# Patient Record
Sex: MALE | Race: WHITE | NOT HISPANIC OR LATINO | URBAN - METROPOLITAN AREA
[De-identification: names, ages, dates, MRNs, and addresses within clinical notes are randomized per-mention and may not be internally consistent; named-entity substitution may affect disease eponyms.]

---

## 2017-01-02 ENCOUNTER — EMERGENCY (EMERGENCY)
Facility: HOSPITAL | Age: 32
LOS: 0 days | Discharge: HOME | End: 2017-01-02

## 2017-06-27 DIAGNOSIS — G40.909 EPILEPSY, UNSPECIFIED, NOT INTRACTABLE, WITHOUT STATUS EPILEPTICUS: ICD-10-CM
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
## 2017-06-27 DIAGNOSIS — S42.211A UNSPECIFIED DISPLACED FRACTURE OF SURGICAL NECK OF RIGHT HUMERUS, INITIAL ENCOUNTER FOR CLOSED FRACTURE: ICD-10-CM

## 2019-03-27 ENCOUNTER — OUTPATIENT (OUTPATIENT)
Dept: OUTPATIENT SERVICES | Facility: HOSPITAL | Age: 34
LOS: 1 days | Discharge: HOME | End: 2019-03-27

## 2019-03-27 DIAGNOSIS — R51 HEADACHE: ICD-10-CM

## 2019-03-27 DIAGNOSIS — R42 DIZZINESS AND GIDDINESS: ICD-10-CM

## 2019-03-28 ENCOUNTER — OUTPATIENT (OUTPATIENT)
Dept: OUTPATIENT SERVICES | Facility: HOSPITAL | Age: 34
LOS: 1 days | Discharge: HOME | End: 2019-03-28

## 2019-03-28 DIAGNOSIS — G35 MULTIPLE SCLEROSIS: ICD-10-CM

## 2019-04-01 ENCOUNTER — INPATIENT (INPATIENT)
Facility: HOSPITAL | Age: 34
LOS: 3 days | Discharge: HOME | End: 2019-04-05
Attending: HOSPITALIST | Admitting: HOSPITALIST
Payer: COMMERCIAL

## 2019-04-01 ENCOUNTER — RESULT REVIEW (OUTPATIENT)
Age: 34
End: 2019-04-01

## 2019-04-01 VITALS
HEART RATE: 78 BPM | DIASTOLIC BLOOD PRESSURE: 78 MMHG | SYSTOLIC BLOOD PRESSURE: 132 MMHG | RESPIRATION RATE: 18 BRPM | TEMPERATURE: 98 F

## 2019-04-01 DIAGNOSIS — Z98.890 OTHER SPECIFIED POSTPROCEDURAL STATES: Chronic | ICD-10-CM

## 2019-04-01 LAB
ALBUMIN SERPL ELPH-MCNC: 4.9 G/DL — SIGNIFICANT CHANGE UP (ref 3.5–5.2)
ALP SERPL-CCNC: 99 U/L — SIGNIFICANT CHANGE UP (ref 30–115)
ALT FLD-CCNC: 92 U/L — HIGH (ref 0–41)
ANION GAP SERPL CALC-SCNC: 12 MMOL/L — SIGNIFICANT CHANGE UP (ref 7–14)
APPEARANCE CSF: CLEAR — SIGNIFICANT CHANGE UP
APPEARANCE SPUN FLD: COLORLESS — SIGNIFICANT CHANGE UP
APTT BLD: 33.3 SEC — SIGNIFICANT CHANGE UP (ref 27–39.2)
AST SERPL-CCNC: 46 U/L — HIGH (ref 0–41)
BASOPHILS # BLD AUTO: 0.05 K/UL — SIGNIFICANT CHANGE UP (ref 0–0.2)
BASOPHILS NFR BLD AUTO: 0.9 % — SIGNIFICANT CHANGE UP (ref 0–1)
BILIRUB SERPL-MCNC: 0.6 MG/DL — SIGNIFICANT CHANGE UP (ref 0.2–1.2)
BUN SERPL-MCNC: 12 MG/DL — SIGNIFICANT CHANGE UP (ref 10–20)
CALCIUM SERPL-MCNC: 10.1 MG/DL — SIGNIFICANT CHANGE UP (ref 8.5–10.1)
CHLORIDE SERPL-SCNC: 106 MMOL/L — SIGNIFICANT CHANGE UP (ref 98–110)
CO2 SERPL-SCNC: 24 MMOL/L — SIGNIFICANT CHANGE UP (ref 17–32)
COLOR CSF: COLORLESS — SIGNIFICANT CHANGE UP
CREAT SERPL-MCNC: 1 MG/DL — SIGNIFICANT CHANGE UP (ref 0.7–1.5)
EOSINOPHIL # BLD AUTO: 0.18 K/UL — SIGNIFICANT CHANGE UP (ref 0–0.7)
EOSINOPHIL NFR BLD AUTO: 3.2 % — SIGNIFICANT CHANGE UP (ref 0–8)
GLUCOSE CSF-MCNC: 64 MG/DL — SIGNIFICANT CHANGE UP (ref 45–75)
GLUCOSE SERPL-MCNC: 93 MG/DL — SIGNIFICANT CHANGE UP (ref 70–99)
HCT VFR BLD CALC: 48.1 % — SIGNIFICANT CHANGE UP (ref 42–52)
HGB BLD-MCNC: 16.1 G/DL — SIGNIFICANT CHANGE UP (ref 14–18)
IMM GRANULOCYTES NFR BLD AUTO: 0.5 % — HIGH (ref 0.1–0.3)
INR BLD: 1.01 RATIO — SIGNIFICANT CHANGE UP (ref 0.65–1.3)
LYMPHOCYTES # BLD AUTO: 1.79 K/UL — SIGNIFICANT CHANGE UP (ref 1.2–3.4)
LYMPHOCYTES # BLD AUTO: 32 % — SIGNIFICANT CHANGE UP (ref 20.5–51.1)
MCHC RBC-ENTMCNC: 29.3 PG — SIGNIFICANT CHANGE UP (ref 27–31)
MCHC RBC-ENTMCNC: 33.5 G/DL — SIGNIFICANT CHANGE UP (ref 32–37)
MCV RBC AUTO: 87.6 FL — SIGNIFICANT CHANGE UP (ref 80–94)
MONOCYTES # BLD AUTO: 0.62 K/UL — HIGH (ref 0.1–0.6)
MONOCYTES NFR BLD AUTO: 11.1 % — HIGH (ref 1.7–9.3)
NEUTROPHILS # BLD AUTO: 2.93 K/UL — SIGNIFICANT CHANGE UP (ref 1.4–6.5)
NEUTROPHILS # CSF: SIGNIFICANT CHANGE UP % (ref 0–6)
NEUTROPHILS NFR BLD AUTO: 52.3 % — SIGNIFICANT CHANGE UP (ref 42.2–75.2)
NRBC # BLD: 0 /100 WBCS — SIGNIFICANT CHANGE UP (ref 0–0)
NRBC NFR CSF: 1 /UL — SIGNIFICANT CHANGE UP (ref 0–5)
PLATELET # BLD AUTO: 215 K/UL — SIGNIFICANT CHANGE UP (ref 130–400)
POTASSIUM SERPL-MCNC: 4.5 MMOL/L — SIGNIFICANT CHANGE UP (ref 3.5–5)
POTASSIUM SERPL-SCNC: 4.5 MMOL/L — SIGNIFICANT CHANGE UP (ref 3.5–5)
PROT CSF-MCNC: 33 MG/DL — SIGNIFICANT CHANGE UP (ref 15–45)
PROT SERPL-MCNC: 7.6 G/DL — SIGNIFICANT CHANGE UP (ref 6–8)
PROTHROM AB SERPL-ACNC: 11.6 SEC — SIGNIFICANT CHANGE UP (ref 9.95–12.87)
RBC # BLD: 5.49 M/UL — SIGNIFICANT CHANGE UP (ref 4.7–6.1)
RBC # CSF: 3 /UL — SIGNIFICANT CHANGE UP (ref 0–0)
RBC # FLD: 11.9 % — SIGNIFICANT CHANGE UP (ref 11.5–14.5)
SODIUM SERPL-SCNC: 142 MMOL/L — SIGNIFICANT CHANGE UP (ref 135–146)
TUBE TYPE: SIGNIFICANT CHANGE UP
WBC # BLD: 5.6 K/UL — SIGNIFICANT CHANGE UP (ref 4.8–10.8)
WBC # FLD AUTO: 5.6 K/UL — SIGNIFICANT CHANGE UP (ref 4.8–10.8)

## 2019-04-01 PROCEDURE — 62270 DX LMBR SPI PNXR: CPT

## 2019-04-01 PROCEDURE — 77003 FLUOROGUIDE FOR SPINE INJECT: CPT | Mod: 26

## 2019-04-01 PROCEDURE — 88108 CYTOPATH CONCENTRATE TECH: CPT | Mod: 26

## 2019-04-01 RX ORDER — LEVETIRACETAM 250 MG/1
500 TABLET, FILM COATED ORAL
Qty: 0 | Refills: 0 | Status: DISCONTINUED | OUTPATIENT
Start: 2019-04-01 | End: 2019-04-03

## 2019-04-01 RX ORDER — LEVETIRACETAM 250 MG/1
1 TABLET, FILM COATED ORAL
Qty: 0 | Refills: 0 | COMMUNITY

## 2019-04-01 RX ORDER — CHLORHEXIDINE GLUCONATE 213 G/1000ML
1 SOLUTION TOPICAL
Qty: 0 | Refills: 0 | Status: DISCONTINUED | OUTPATIENT
Start: 2019-04-01 | End: 2019-04-05

## 2019-04-01 RX ADMIN — LEVETIRACETAM 500 MILLIGRAM(S): 250 TABLET, FILM COATED ORAL at 18:32

## 2019-04-01 RX ADMIN — Medication 58 MILLIGRAM(S): at 17:04

## 2019-04-01 NOTE — H&P ADULT - ASSESSMENT
34 y.o male patient with PMH of epilepsy is being admitted for lumbar puncture and pulse steroids for suspected MS.    # Suspected MS  - Active lesion on MRI (see results above)  - F/U lumbar puncture results  - Will give Solumedrol 1g q24h for 5 days as per neurology; check FS and start insulin if needed  - Check NMO and MOG antibodies  - F/U with neurology    # History of epilepsy  - Generalized  - Last seizure 3 years ago  - Continue Keppra 500mg q12h (Patient would like to use his own medication)  - Monitor electrolytes and keep Mg >2  - F/U with neurology    # Elevated ALT  - Check repeat liver function test    # DVT prophylaxis: encourage ambulation  # Regular diet, switch to carb consistent diet if FS elevated 34 y.o male patient with PMH of epilepsy is being admitted for lumbar puncture and pulse steroids for suspected MS.    # Suspected MS  - Active lesion on MRI (see results above)  - F/U lumbar puncture results  - Will give Solumedrol 1g q24h for 5 days as per neurology; check FS and start insulin if needed  - Check NMO and MOG antibodies  - F/U with neurology    # History of epilepsy  - Generalized  - Last seizure 3 years ago  - Continue Keppra 500mg q12h (Patient would like to use his own medication)  - Monitor electrolytes and keep Mg >2  - F/U with neurology    # Elevated ALT  - Secondary to Keppra?  - No GI symptoms  - Monitor LFTs    # DVT prophylaxis: encourage ambulation  # Regular diet, switch to carb consistent diet if FS elevated 34 y.o male patient with PMH of epilepsy is being admitted for lumbar puncture and pulse steroids for suspected MS.    # Suspected MS  - Active lesion on MRI (see results above)  - F/U lumbar puncture results (done by IR)  - Will give Solumedrol 1g q24h for 5 days as per neurology; check FS and start insulin if needed  - Check NMO and MOG antibodies  - F/U with neurology    # History of epilepsy  - Generalized  - Last seizure 3 years ago  - Continue Keppra 500mg q12h (Patient would like to use his own medication)  - Monitor electrolytes and keep Mg >2  - F/U with neurology    # Elevated ALT  - Secondary to Keppra?  - No GI symptoms  - Monitor LFTs    # DVT prophylaxis: encourage ambulation  # Regular diet, switch to carb consistent diet if FS elevated

## 2019-04-01 NOTE — H&P ADULT - NEUROLOGICAL DETAILS
cranial nerves intact/alert and oriented x 3/normal strength/responds to verbal commands/no spontaneous movement

## 2019-04-01 NOTE — H&P ADULT - ATTENDING COMMENTS
Patient seen and examined independently. Agree with resident note with exceptions.     # CNS Demyelination, probably MS with optic neuritis  - S/p Spinal tap  - F/u NMO abs, MOG abs  - MRI Cervical and thoracic spine w/w/o gado  - Pt to receive IV solumedrol 1 gm for 5days    # H/o Epilepsy  - seizure precautions  - c/w keppra    # Transaminitis- resolving  - monitor LFT

## 2019-04-01 NOTE — H&P ADULT - RS GEN PE MLT RESP DETAILS PC
no wheezes/airway patent/no rales/good air movement/breath sounds equal/clear to auscultation bilaterally/respirations non-labored

## 2019-04-01 NOTE — H&P ADULT - NSHPLABSRESULTS_GEN_ALL_CORE
Labs:                          16.1   5.60  )-----------( 215      ( 01 Apr 2019 09:09 )             48.1     04-01    142  |  106  |  12  ----------------------------<  93  4.5   |  24  |  1.0    Ca    10.1      01 Apr 2019 09:09    TPro  7.6  /  Alb  4.9  /  TBili  0.6  /  DBili  x   /  AST  46<H>  /  ALT  92<H>  /  AlkPhos  99  04-01    PT/INR - ( 01 Apr 2019 09:09 )   PT: 11.60 sec;   INR: 1.01 ratio       PTT - ( 01 Apr 2019 09:09 )  PTT:33.3 sec    LIVER FUNCTIONS - ( 01 Apr 2019 09:09 )  Alb: 4.9 g/dL / Pro: 7.6 g/dL / ALK PHOS: 99 U/L / ALT: 92 U/L / AST: 46 U/L / GGT: x           < from: MR Head w/ IV Cont (03.28.19 @ 18:56) >    IMPRESSION:    1.  Redemonstrated numerous foci of white matter signal abnormality with a pattern/distribution suggesting demyelinating disease.    2.  Small focus of enhancement is noted along the right aspect of the anterior body of the corpus callosum suggesting active demyelination, series 11 image 63 and series 21 image 99.      < end of copied text >    < from: MR Head No Cont (03.27.19 @ 16:56) >    IMPRESSION:     Numerous foci of white matter signal abnormality. The pattern and   distribution suggests demyelinating disease.  Consider a postcontrast   study to assess for active demyelination.    < end of copied text >    < from: MR Angio Head No Cont (03.27.19 @ 16:41) >    IMPRESSION:      Unremarkable MRA of the brain.    < end of copied text >

## 2019-04-01 NOTE — CONSULT NOTE ADULT - ASSESSMENT
33 yo with PGE on AEDs currently with blurred vision and lightheaded/dizziness foudn with demyelinating plaques s/o CNS demyelination.      Plan:  - start solumedrol 1g IVPB x 5 days with first dose today  - f/u CSF results  - check NMO abs, MOG abs  - check MRI Cervical and thoracic spine w/w/o gado  - continue keppra 500 mg BID for now  - may d/c with outpt f/u s/p solumedrol complete

## 2019-04-01 NOTE — H&P ADULT - NSHPSOCIALHISTORY_GEN_ALL_CORE
Non smoker; drinks 1-2 glasses of wine daily but hasn't drank for the past month; no illicit drug use

## 2019-04-01 NOTE — CONSULT NOTE ADULT - SUBJECTIVE AND OBJECTIVE BOX
Neurology Consult    Patient is a 34y old  Male who presents with a chief complaint of Suspicion for MS / pulse steroids (01 Apr 2019 12:07)      HPI:  34 y.o male patient with PMH of PGE on AEDs stable p/w 1 week h/o bilateral HA with dizziness and lightheaded a/w binocular blurred vision.  Symptoms improved slightly over last week, seen by neurologist Dr. Lewis sent for MRI with areas of acute and chronic demyelinating plaques.  Had LP today for further assessment and sent to hospital for IV solumedrol.  Pt currently stable. Still feels lightheaded with blurred vision slight but persistent.  No weakness or numbness.  No vertigo or ataxia.  Had numbness in the L thigh after LP otherwise normal.  No eye pain with movement.      PAST MEDICAL & SURGICAL HISTORY:  Epilepsy  H/O hernia repair    FAMILY HISTORY:  FH: epilepsy: Father with epilepsy    Social History: (-) x 3    Allergies    No Known Allergies    Intolerances    MEDICATIONS  (STANDING):  chlorhexidine 4% Liquid 1 Application(s) Topical <User Schedule>  levETIRAcetam 500 milliGRAM(s) Oral two times a day  methylPREDNISolone sodium succinate IVPB 1000 milliGRAM(s) IV Intermittent daily    MEDICATIONS  (PRN):    Review of systems:    Constitutional: as per HPI  Eyes: No eye pain or discharge  ENMT:  No difficulty hearing; No sinus or throat pain  Neck: No pain or stiffness  Respiratory: No cough, wheezing, chills or hemoptysis  Cardiovascular: No chest pain, palpitations, shortness of breath, dyspnea on exertion  Gastrointestinal: No abdominal pain, nausea, vomiting or hematemesis; No diarrhea or constipation.   Genitourinary: No dysuria, frequency, hematuria or incontinence  Neurological: As per HPI  Skin: No rashes or lesions   Endocrine: No heat or cold intolerance; No hair loss  Musculoskeletal: No joint pain or swelling  Psychiatric: No depression, anxiety, mood swings  Heme/Lymph: No easy bruising or bleeding gums    Vital Signs Last 24 Hrs  T(C): 36.4 (01 Apr 2019 14:49), Max: 36.4 (01 Apr 2019 14:49)  T(F): 97.5 (01 Apr 2019 14:49), Max: 97.5 (01 Apr 2019 14:49)  HR: 78 (01 Apr 2019 14:49) (78 - 78)  BP: 132/78 (01 Apr 2019 14:49) (132/78 - 132/78)  BP(mean): --  RR: 18 (01 Apr 2019 14:49) (18 - 18)  SpO2: --    Examination:  General:  Appearance is consistent with chronologic age.  No abnormal facies.  Gross skin survey within normal limits.    Cognitive/Language:  The patient is oriented to person, place, time and date.  Recent and remote memory intact.  Fund of knowledge is intact and normal.  Language with normal repetition, comprehension and naming.  Nondysarthric.    Eyes: intact VA, VFF.  EOMI w/o nystagmus, skew or reported double vision.  PERRL.  No ptosis/weakness of eyelid closure.    Face:  Facial sensation normal V1 - 3, no facial asymmetry.    Ears/Nose/Throat:  Hearing grossly intact b/l.  Palate elevates midline.  Tongue and uvula midline.   Motor examination:   Normal tone, bulk and range of motion.  No tenderness, twitching, tremors or involuntary movements.  Formal Muscle Strength Testing: (MRC grade R/L) 5/5 UE; 5/5 LE.  No observable drift.  Reflexes:   2+ b/l pectoralis, biceps, triceps, brachioradialis, patella and Achilles.  Plantar response downgoing b/l.  Jaw jerk, Richelle, clonus absent.  Sensory examination:   Intact to light touch and pinprick, pain, temperature and proprioception and vibration in all extremities.  Cerebellum:   FTN/HKS intact with normal BOSTON in all limbs.  No dysmetria or dysdiadokinesia.  Gait narrow based and normal.    Labs:   CBC Full  -  ( 01 Apr 2019 09:09 )  WBC Count : 5.60 K/uL  RBC Count : 5.49 M/uL  Hemoglobin : 16.1 g/dL  Hematocrit : 48.1 %  Platelet Count - Automated : 215 K/uL  Mean Cell Volume : 87.6 fL  Mean Cell Hemoglobin : 29.3 pg  Mean Cell Hemoglobin Concentration : 33.5 g/dL  Auto Neutrophil # : 2.93 K/uL  Auto Lymphocyte # : 1.79 K/uL  Auto Monocyte # : 0.62 K/uL  Auto Eosinophil # : 0.18 K/uL  Auto Basophil # : 0.05 K/uL  Auto Neutrophil % : 52.3 %  Auto Lymphocyte % : 32.0 %  Auto Monocyte % : 11.1 %  Auto Eosinophil % : 3.2 %  Auto Basophil % : 0.9 %    04-01    142  |  106  |  12  ----------------------------<  93  4.5   |  24  |  1.0    Ca    10.1      01 Apr 2019 09:09    TPro  7.6  /  Alb  4.9  /  TBili  0.6  /  DBili  x   /  AST  46<H>  /  ALT  92<H>  /  AlkPhos  99  04-01    LIVER FUNCTIONS - ( 01 Apr 2019 09:09 )  Alb: 4.9 g/dL / Pro: 7.6 g/dL / ALK PHOS: 99 U/L / ALT: 92 U/L / AST: 46 U/L / GGT: x           PT/INR - ( 01 Apr 2019 09:09 )   PT: 11.60 sec;   INR: 1.01 ratio         PTT - ( 01 Apr 2019 09:09 )  PTT:33.3 sec    Glucose, CSF: 64 mg/dL (04-01-19 @ 14:43)  Protein, CSF: 33 mg/dL (04-01-19 @ 14:43)    < from: MR Head w/ IV Cont (03.28.19 @ 18:56) >  IMPRESSION:    1.  Redemonstrated numerous foci of white matter signal abnormality with   a pattern/distribution suggesting demyelinating disease.    2.  Small focus of enhancement is noted along the right aspect of the   anterior body of the corpus callosum suggesting active demyelination,   series 11 image 63 and series 21 image 99.      AMOL JONES M.D., ATTENDING RADIOLOGIST  This document has been electronically signed. Mar 29 2019  9:43AM    < end of copied text >        04-01-19 @ 15:40

## 2019-04-01 NOTE — H&P ADULT - HISTORY OF PRESENT ILLNESS
34 y.o male patient with PMH of epilepsy is being admitted for lumbar puncture and pulse steroids for suspected MS.    History goes back to about 1 week prior to presentation when the patient developed a headache, bi-parietal, with no exacerbating or relieving factors, slightly improved with Tylenol and Excedrin. This type of headache was new to the patient. Headache was associated with lightheadedness and vertigo as well as constant bilateral blurry vision.  He visited his neurologist who ordered an MRI that showed lesions suspicious for demyelinating disease.  He was admitted to the hospital for lumbar puncture and pulse steroids.    Of note, 2 weeks ago, patient had rhinorrhea and a productive and was prescribed antibiotics. ROS otherwise negative

## 2019-04-02 LAB
% ALBUMIN: 63.3 % — SIGNIFICANT CHANGE UP
% ALPHA 1: 3.1 % — SIGNIFICANT CHANGE UP
% ALPHA 2: 7.8 % — SIGNIFICANT CHANGE UP
% BETA: 12.2 % — SIGNIFICANT CHANGE UP
% GAMMA: 13.6 % — SIGNIFICANT CHANGE UP
ALBUMIN SERPL ELPH-MCNC: 4.9 G/DL — SIGNIFICANT CHANGE UP (ref 3.6–5.5)
ALBUMIN SERPL ELPH-MCNC: 5.1 G/DL — SIGNIFICANT CHANGE UP (ref 3.5–5.2)
ALBUMIN/GLOB SERPL ELPH: 1.8 RATIO — SIGNIFICANT CHANGE UP
ALP SERPL-CCNC: 98 U/L — SIGNIFICANT CHANGE UP (ref 30–115)
ALPHA1 GLOB SERPL ELPH-MCNC: 0.2 G/DL — SIGNIFICANT CHANGE UP (ref 0.1–0.4)
ALPHA2 GLOB SERPL ELPH-MCNC: 0.6 G/DL — SIGNIFICANT CHANGE UP (ref 0.5–1)
ALT FLD-CCNC: 90 U/L — HIGH (ref 0–41)
ANION GAP SERPL CALC-SCNC: 15 MMOL/L — HIGH (ref 7–14)
AST SERPL-CCNC: 34 U/L — SIGNIFICANT CHANGE UP (ref 0–41)
B-GLOBULIN SERPL ELPH-MCNC: 0.9 G/DL — SIGNIFICANT CHANGE UP (ref 0.5–1)
BASOPHILS # BLD AUTO: 0.01 K/UL — SIGNIFICANT CHANGE UP (ref 0–0.2)
BASOPHILS NFR BLD AUTO: 0.1 % — SIGNIFICANT CHANGE UP (ref 0–1)
BILIRUB DIRECT SERPL-MCNC: 0.2 MG/DL — SIGNIFICANT CHANGE UP (ref 0–0.2)
BILIRUB INDIRECT FLD-MCNC: 0.4 MG/DL — SIGNIFICANT CHANGE UP (ref 0.2–1.2)
BILIRUB SERPL-MCNC: 0.6 MG/DL — SIGNIFICANT CHANGE UP (ref 0.2–1.2)
BUN SERPL-MCNC: 14 MG/DL — SIGNIFICANT CHANGE UP (ref 10–20)
CALCIUM SERPL-MCNC: 10.4 MG/DL — HIGH (ref 8.5–10.1)
CHLORIDE SERPL-SCNC: 104 MMOL/L — SIGNIFICANT CHANGE UP (ref 98–110)
CO2 SERPL-SCNC: 23 MMOL/L — SIGNIFICANT CHANGE UP (ref 17–32)
CREAT SERPL-MCNC: 1 MG/DL — SIGNIFICANT CHANGE UP (ref 0.7–1.5)
DSDNA AB FLD-ACNC: 0.2 AI — SIGNIFICANT CHANGE UP
ENA SS-A AB FLD IA-ACNC: <0.2 AI — SIGNIFICANT CHANGE UP
EOSINOPHIL # BLD AUTO: 0 K/UL — SIGNIFICANT CHANGE UP (ref 0–0.7)
EOSINOPHIL NFR BLD AUTO: 0 % — SIGNIFICANT CHANGE UP (ref 0–8)
ERYTHROCYTE [SEDIMENTATION RATE] IN BLOOD: 2 MM/HR — SIGNIFICANT CHANGE UP (ref 0–10)
GAMMA GLOBULIN: 1 G/DL — SIGNIFICANT CHANGE UP (ref 0.6–1.6)
GLUCOSE SERPL-MCNC: 138 MG/DL — HIGH (ref 70–99)
HCT VFR BLD CALC: 50.2 % — SIGNIFICANT CHANGE UP (ref 42–52)
HGB BLD-MCNC: 16.8 G/DL — SIGNIFICANT CHANGE UP (ref 14–18)
IMM GRANULOCYTES NFR BLD AUTO: 0.5 % — HIGH (ref 0.1–0.3)
INTERPRETATION SERPL IFE-IMP: SIGNIFICANT CHANGE UP
LYMPHOCYTES # BLD AUTO: 1.25 K/UL — SIGNIFICANT CHANGE UP (ref 1.2–3.4)
LYMPHOCYTES # BLD AUTO: 8.6 % — LOW (ref 20.5–51.1)
MAGNESIUM SERPL-MCNC: 2.1 MG/DL — SIGNIFICANT CHANGE UP (ref 1.8–2.4)
MCHC RBC-ENTMCNC: 29.4 PG — SIGNIFICANT CHANGE UP (ref 27–31)
MCHC RBC-ENTMCNC: 33.5 G/DL — SIGNIFICANT CHANGE UP (ref 32–37)
MCV RBC AUTO: 87.8 FL — SIGNIFICANT CHANGE UP (ref 80–94)
MONOCYTES # BLD AUTO: 0.39 K/UL — SIGNIFICANT CHANGE UP (ref 0.1–0.6)
MONOCYTES NFR BLD AUTO: 2.7 % — SIGNIFICANT CHANGE UP (ref 1.7–9.3)
NEUTROPHILS # BLD AUTO: 12.89 K/UL — HIGH (ref 1.4–6.5)
NEUTROPHILS NFR BLD AUTO: 88.1 % — HIGH (ref 42.2–75.2)
NON-GYNECOLOGICAL CYTOLOGY STUDY: SIGNIFICANT CHANGE UP
NRBC # BLD: 0 /100 WBCS — SIGNIFICANT CHANGE UP (ref 0–0)
PLATELET # BLD AUTO: 254 K/UL — SIGNIFICANT CHANGE UP (ref 130–400)
POTASSIUM SERPL-MCNC: 4.4 MMOL/L — SIGNIFICANT CHANGE UP (ref 3.5–5)
POTASSIUM SERPL-SCNC: 4.4 MMOL/L — SIGNIFICANT CHANGE UP (ref 3.5–5)
PROT PATTERN SERPL ELPH-IMP: SIGNIFICANT CHANGE UP
PROT SERPL-MCNC: 7.7 G/DL — SIGNIFICANT CHANGE UP (ref 6–8.3)
PROT SERPL-MCNC: 7.7 G/DL — SIGNIFICANT CHANGE UP (ref 6–8.3)
PROT SERPL-MCNC: 7.8 G/DL — SIGNIFICANT CHANGE UP (ref 6–8)
RBC # BLD: 5.72 M/UL — SIGNIFICANT CHANGE UP (ref 4.7–6.1)
RBC # FLD: 11.6 % — SIGNIFICANT CHANGE UP (ref 11.5–14.5)
SODIUM SERPL-SCNC: 142 MMOL/L — SIGNIFICANT CHANGE UP (ref 135–146)
WBC # BLD: 14.61 K/UL — HIGH (ref 4.8–10.8)
WBC # FLD AUTO: 14.61 K/UL — HIGH (ref 4.8–10.8)

## 2019-04-02 RX ADMIN — LEVETIRACETAM 500 MILLIGRAM(S): 250 TABLET, FILM COATED ORAL at 05:59

## 2019-04-02 RX ADMIN — Medication 58 MILLIGRAM(S): at 10:53

## 2019-04-02 RX ADMIN — LEVETIRACETAM 500 MILLIGRAM(S): 250 TABLET, FILM COATED ORAL at 17:28

## 2019-04-02 NOTE — PROGRESS NOTE ADULT - SUBJECTIVE AND OBJECTIVE BOX
SUBJECTIVE:    Patient is a 34y old Male who presents with a chief complaint of Suspicion for MS / pulse steroids (01 Apr 2019 16:26)      HPI:  34 y.o male patient with PMH of epilepsy is being admitted for lumbar puncture and pulse steroids for suspected MS.    History goes back to about 1 week prior to presentation when the patient developed a headache, bi-parietal, with no exacerbating or relieving factors, slightly improved with Tylenol and Excedrin. This type of headache was new to the patient. Headache was associated with lightheadedness and vertigo as well as constant bilateral blurry vision.  He visited his neurologist who ordered an MRI that showed lesions suspicious for demyelinating disease.  He was admitted to the hospital for lumbar puncture and pulse steroids.    Of note, 2 weeks ago, patient had rhinorrhea and a productive and was prescribed antibiotics. ROS otherwise negative (01 Apr 2019 12:07)    not in any pain / no dizziness / no headaches / blurry vision improved    Besides the pertinent positives and negatives described above, the ROS was within normal limits.    PAST MEDICAL & SURGICAL HISTORY  Epilepsy  H/O hernia repair    SOCIAL HISTORY:    ALLERGIES:  No Known Allergies    MEDICATIONS:  STANDING MEDICATIONS  chlorhexidine 4% Liquid 1 Application(s) Topical <User Schedule>  levETIRAcetam 500 milliGRAM(s) Oral two times a day  methylPREDNISolone sodium succinate IVPB 1000 milliGRAM(s) IV Intermittent daily    PRN MEDICATIONS    VITALS:   T(F): 97.8  HR: 89  BP: 137/81  RR: 18  SpO2: --    LABS:                        16.8   14.61 )-----------( 254      ( 02 Apr 2019 10:18 )             50.2     04-02    142  |  104  |  14  ----------------------------<  138<H>  4.4   |  23  |  1.0    Ca    10.4<H>      02 Apr 2019 10:18  Mg     2.1     04-02    TPro  7.8  /  Alb  5.1  /  TBili  0.6  /  DBili  0.2  /  AST  34  /  ALT  90<H>  /  AlkPhos  98  04-02    PT/INR - ( 01 Apr 2019 09:09 )   PT: 11.60 sec;   INR: 1.01 ratio         PTT - ( 01 Apr 2019 09:09 )  PTT:33.3 sec      Sedimentation Rate, Erythrocyte: 2 mm/Hr (04-01-19 @ 22:50)          RADIOLOGY:    PHYSICAL EXAM:  GEN: No acute distress  LUNGS: Clear to auscultation bilaterally   HEART: Regular  ABD: Soft, non-tender, non-distended.  EXT: NC/NC/NE/2+PP/PALOMO/Skin Intact.   NEURO: AAOX3, nonfocal, EOMI 5/5 muscle strength UE and LE bilaterally    Intravenous access: yes  NG tube: no  Flynn Catheter: no

## 2019-04-02 NOTE — PROGRESS NOTE ADULT - ASSESSMENT
34 yoM w/ hx of epilepsy presents for pulse steroids after suspicion for MS because of demyelinating disease seen on the MRI, acute enhancement on MRI suggests acute attack    # Suspected MS  -lumbar tap done yesterday, awaiting antibodies  -MRI showin.  Redemonstrated numerous foci of white matter signal abnormality with   a pattern/distribution suggesting demyelinating disease.    2.  Small focus of enhancement is noted along the right aspect of the   anterior body of the corpus callosum suggesting active demyelination,   series 11 image 63 and series 21 image 99.    -started pulse dose steroids, solumedrol IV 1000 for 5 days  -no focal deficits    # Epilepsy  -keppra 500 bid    PLAN/DISPO home after steroids are finished, today is day 2/5, f/u LP antibodies    DVT/GI ppx not indicated

## 2019-04-03 PROBLEM — G40.909 EPILEPSY, UNSPECIFIED, NOT INTRACTABLE, WITHOUT STATUS EPILEPTICUS: Chronic | Status: ACTIVE | Noted: 2019-04-01

## 2019-04-03 PROBLEM — Z00.00 ENCOUNTER FOR PREVENTIVE HEALTH EXAMINATION: Status: ACTIVE | Noted: 2019-04-03

## 2019-04-03 LAB — ANA TITR SER: NEGATIVE — SIGNIFICANT CHANGE UP

## 2019-04-03 PROCEDURE — 72157 MRI CHEST SPINE W/O & W/DYE: CPT | Mod: 26

## 2019-04-03 PROCEDURE — 72156 MRI NECK SPINE W/O & W/DYE: CPT | Mod: 26,76

## 2019-04-03 RX ORDER — PANTOPRAZOLE SODIUM 20 MG/1
40 TABLET, DELAYED RELEASE ORAL
Qty: 0 | Refills: 0 | Status: DISCONTINUED | OUTPATIENT
Start: 2019-04-03 | End: 2019-04-05

## 2019-04-03 RX ORDER — LEVETIRACETAM 250 MG/1
500 TABLET, FILM COATED ORAL
Qty: 0 | Refills: 0 | Status: DISCONTINUED | OUTPATIENT
Start: 2019-04-03 | End: 2019-04-03

## 2019-04-03 RX ORDER — LEVETIRACETAM 250 MG/1
500 TABLET, FILM COATED ORAL
Qty: 0 | Refills: 0 | Status: DISCONTINUED | OUTPATIENT
Start: 2019-04-03 | End: 2019-04-05

## 2019-04-03 RX ADMIN — LEVETIRACETAM 500 MILLIGRAM(S): 250 TABLET, FILM COATED ORAL at 18:04

## 2019-04-03 RX ADMIN — Medication 58 MILLIGRAM(S): at 08:20

## 2019-04-03 RX ADMIN — PANTOPRAZOLE SODIUM 40 MILLIGRAM(S): 20 TABLET, DELAYED RELEASE ORAL at 10:13

## 2019-04-03 RX ADMIN — LEVETIRACETAM 500 MILLIGRAM(S): 250 TABLET, FILM COATED ORAL at 05:41

## 2019-04-03 NOTE — PROGRESS NOTE ADULT - SUBJECTIVE AND OBJECTIVE BOX
Neurology Progress Note    Interval History:  Pt currently doing well.  No new events.  Tolerating medications well.      PAST MEDICAL & SURGICAL HISTORY:  Epilepsy  H/O hernia repair    Medications:  chlorhexidine 4% Liquid 1 Application(s) Topical <User Schedule>  levETIRAcetam 500 milliGRAM(s) Oral two times a day  methylPREDNISolone sodium succinate IVPB 1000 milliGRAM(s) IV Intermittent daily  pantoprazole    Tablet 40 milliGRAM(s) Oral before breakfast    Vital Signs Last 24 Hrs  T(C): 36.6 (03 Apr 2019 05:58), Max: 37.1 (02 Apr 2019 21:48)  T(F): 97.9 (03 Apr 2019 05:58), Max: 98.7 (02 Apr 2019 21:48)  HR: 63 (03 Apr 2019 05:58) (63 - 94)  BP: 110/55 (03 Apr 2019 05:58) (110/55 - 137/81)  BP(mean): --  RR: 18 (03 Apr 2019 05:58) (18 - 20)  SpO2: --    Neurological Exam:   Mental status: Awake, alert and oriented x3.  Recent and remote memory intact.  Naming, repetition and comprehension intact.  Attention/concentration intact.  No dysarthria, no aphasia.  Fund of knowledge appropriate.    Cranial nerves: Pupils equally round and reactive to light, visual fields full, no nystagmus, extraocular muscles intact, V1 through V3 intact bilaterally and symmetric, face symmetric, hearing intact to finger rub, palate elevation symmetric, tongue was midline.  Motor:  MRC grading 5/5 b/l UE/LE.   strength 5/5.  Normal tone and bulk.  No abnormal movements.    Sensation: Intact to light touch, proprioception, and pinprick.   Coordination: No dysmetria on finger-to-nose and heel-to-shin.  No dysdiadokinesia.  Reflexes: 2+ in bilateral UE/LE, downgoing toes bilaterally. (-) Olivas.  Gait: Narrow and steady. No ataxia.  Romberg negative    Labs:  CBC Full  -  ( 02 Apr 2019 10:18 )  WBC Count : 14.61 K/uL  RBC Count : 5.72 M/uL  Hemoglobin : 16.8 g/dL  Hematocrit : 50.2 %  Platelet Count - Automated : 254 K/uL  Mean Cell Volume : 87.8 fL  Mean Cell Hemoglobin : 29.4 pg  Mean Cell Hemoglobin Concentration : 33.5 g/dL  Auto Neutrophil # : 12.89 K/uL  Auto Lymphocyte # : 1.25 K/uL  Auto Monocyte # : 0.39 K/uL  Auto Eosinophil # : 0.00 K/uL  Auto Basophil # : 0.01 K/uL  Auto Neutrophil % : 88.1 %  Auto Lymphocyte % : 8.6 %  Auto Monocyte % : 2.7 %  Auto Eosinophil % : 0.0 %  Auto Basophil % : 0.1 %    04-02    142  |  104  |  14  ----------------------------<  138<H>  4.4   |  23  |  1.0    Ca    10.4<H>      02 Apr 2019 10:18  Mg     2.1     04-02    TPro  7.8  /  Alb  5.1  /  TBili  0.6  /  DBili  0.2  /  AST  34  /  ALT  90<H>  /  AlkPhos  98  04-02    LIVER FUNCTIONS - ( 02 Apr 2019 10:18 )  Alb: 5.1 g/dL / Pro: 7.8 g/dL / ALK PHOS: 98 U/L / ALT: 90 U/L / AST: 34 U/L / GGT: x           Cerebrospinal Fluid Cell Count-1 (04.01.19 @ 14:43)    Total Nucleated Cell Count, CSF: 1 /uL    RBC Count - Spinal Fluid: 3 /uL    CSF Color: Colorless    Tube Type: Tube 1    CSF Appearance: Clear    CSF Segmented Neutrophils: n/a %    Appearance Spun: Colorless    Cytopathology - Non Gyn Report (04.01.19 @ 09:37)    Cytopathology - Non Gyn Report:   ACCESSION No:  [id number]    ANTONELLA HUNTLEY                      1        Cytopathology Report            Specimen(s) Submitted  CSF      Clinical History  r/o M/S vs Lyme      Gross Description  The specimen is received fresh labeled with the patient's name  and consists of 3 cc of clear fluid. Two single cytospins are  made, one stained with Papanicolaou stain and one stained with  Diff-Quik stain.      Final Diagnosis  CEREBROSPINAL FLUID  NEGATIVE FOR MALIGNANT CELLS.  Few red blood cells.    Screened by: Dede SCHULER(ASCP)  Verified by: Aileen Cunha M.D.  (Electronic Signature)  Reported on: 04/02/19 12:48 EDT, [facility], [address],  [place] [address]  _________________________________________________________________          Assessment:  This is a 34y Male w/ h/o     Plan:

## 2019-04-03 NOTE — PROGRESS NOTE ADULT - ASSESSMENT
34 yoM w/ hx of epilepsy presents for pulse steroids after suspicion for MS because of demyelinating disease seen on the MRI, acute enhancement on MRI suggests acute attack    # Suspected MS  -lumbar tap done yesterday, awaiting antibodies  -MRI showin.  Redemonstrated numerous foci of white matter signal abnormality with   a pattern/distribution suggesting demyelinating disease.    2.  Small focus of enhancement is noted along the right aspect of the   anterior body of the corpus callosum suggesting active demyelination,   series 11 image 63 and series 21 image 99.    -started pulse dose steroids, solumedrol IV 1000 for 5 days  -no focal deficits    # Epilepsy  -keppra 500 bid    PLAN/DISPO home after steroids are finished, today is day 3/5, f/u LP antibodies    DVT/GI ppx not indicated

## 2019-04-03 NOTE — PROGRESS NOTE ADULT - SUBJECTIVE AND OBJECTIVE BOX
SUBJECTIVE:    Patient is a 34y old Male who presents with a chief complaint of Suspicion for MS / pulse steroids (02 Apr 2019 15:01)      HPI:  34 y.o male patient with PMH of epilepsy is being admitted for lumbar puncture and pulse steroids for suspected MS.    History goes back to about 1 week prior to presentation when the patient developed a headache, bi-parietal, with no exacerbating or relieving factors, slightly improved with Tylenol and Excedrin. This type of headache was new to the patient. Headache was associated with lightheadedness and vertigo as well as constant bilateral blurry vision.  He visited his neurologist who ordered an MRI that showed lesions suspicious for demyelinating disease.  He was admitted to the hospital for lumbar puncture and pulse steroids.    Of note, 2 weeks ago, patient had rhinorrhea and a productive and was prescribed antibiotics. ROS otherwise negative (01 Apr 2019 12:07)      Currently admitted to medicine with the primary diagnosis of      mild frontal headache not like the one he came in with, he thinks it's likely a tension headache, other than that no complaints    Besides the pertinent positives and negatives described above, the ROS was within normal limits.    PAST MEDICAL & SURGICAL HISTORY  Epilepsy  H/O hernia repair    SOCIAL HISTORY:    ALLERGIES:  No Known Allergies    MEDICATIONS:  STANDING MEDICATIONS  chlorhexidine 4% Liquid 1 Application(s) Topical <User Schedule>  levETIRAcetam 500 milliGRAM(s) Oral two times a day  methylPREDNISolone sodium succinate IVPB 1000 milliGRAM(s) IV Intermittent daily    PRN MEDICATIONS    VITALS:   T(F): 97.9  HR: 63  BP: 110/55  RR: 18  SpO2: --    LABS:                        16.8   14.61 )-----------( 254      ( 02 Apr 2019 10:18 )             50.2     04-02    142  |  104  |  14  ----------------------------<  138<H>  4.4   |  23  |  1.0    Ca    10.4<H>      02 Apr 2019 10:18  Mg     2.1     04-02    TPro  7.8  /  Alb  5.1  /  TBili  0.6  /  DBili  0.2  /  AST  34  /  ALT  90<H>  /  AlkPhos  98  04-02    PT/INR - ( 01 Apr 2019 09:09 )   PT: 11.60 sec;   INR: 1.01 ratio         PTT - ( 01 Apr 2019 09:09 )  PTT:33.3 sec              RADIOLOGY:    PHYSICAL EXAM:  GEN: No acute distress  LUNGS: Clear to auscultation bilaterally   HEART: Regular  ABD: Soft, non-tender, non-distended.  EXT: noncyanotic/NE/2+PP/PALOMO/Skin Intact.   NEURO: AAOX3, nonfocal, ambulatory    Intravenous access:yes   NG tube: no  Flynn Catheter: no

## 2019-04-03 NOTE — PROGRESS NOTE ADULT - ASSESSMENT
34 y.o male patient with PMH of epilepsy is being admitted for lumbar puncture and pulse steroids for suspected MS.  History goes back to about 1 week prior to presentation when the patient developed a headache, bi-parietal, with no exacerbating or relieving factors, slightly improved with Tylenol and Excedrin. This type of headache was new to the patient. Headache was associated with lightheadedness and vertigo as well as constant bilateral blurry vision.  He visited his neurologist who ordered an MRI that showed lesions suspicious for demyelinating disease.  He was admitted to the hospital for lumbar puncture and pulse steroids.    # CNS Demyelination, probably MS   - S/p Spinal tap  - F/u NMO abs, MOG abs  - MRI Cervical and thoracic spine w/w/o gado  - Pt to receive IV solumedrol 1 gm for 5days, last dose friday    # H/o Epilepsy  - seizure precautions  - c/w keppra    # Transaminitis- resolving  - monitor LFT .     #Pending : completion of pulse dose IV steroids on Friday  # Discussed with Pt daily  # Disposition; Home on 4/5/19

## 2019-04-03 NOTE — PROGRESS NOTE ADULT - SUBJECTIVE AND OBJECTIVE BOX
Patient is a 34y old  Male who presents with a chief complaint of Suspicion for MS / pulse steroids (03 Apr 2019 09:32)    Patient was seen and examined.  No new events    PAST MEDICAL & SURGICAL HISTORY:  Epilepsy  H/O hernia repair    Allergies  No Known Allergies    MEDICATIONS  (STANDING):  chlorhexidine 4% Liquid 1 Application(s) Topical <User Schedule>  levETIRAcetam 500 milliGRAM(s) Oral two times a day  methylPREDNISolone sodium succinate IVPB 1000 milliGRAM(s) IV Intermittent daily  pantoprazole    Tablet 40 milliGRAM(s) Oral before breakfast    MEDICATIONS  (PRN):    Vital Signs Last 24 Hrs  T(C): 36.6  T(F): 97.9  HR: 63  BP: 110/55  BP(mean): --  RR: 18  SpO2: --  O/E:  Awake, alert, not in distress.  HEENT: atraumatic, EOMI.  Chest: clear.  CVS: SIS2 +, no murmur.  P/A: Soft, BS+  CNS: non focal.  Ext: no edema feet.  Skin: no rash, no ulcers.  All systems reviewed positive findings as above.                            16.8   14.61<H> )-----------( 254      ( 02 Apr 2019 10:18 )             50.2     04-02    142  |  104  |  14  ----------------------------<  138<H>  4.4   |  23  |  1.0    Ca    10.4<H>      02 Apr 2019 10:18  Mg     2.1     04-02    TPro  7.8  /  Alb  5.1  /  TBili  0.6  /  DBili  0.2  /  AST  34  /  ALT  90<H>  /  AlkPhos  98  04-02  TPro  7.7  /  Alb  4.9  /  TBili  x   /  DBili  x   /  AST  x   /  ALT  x   /  AlkPhos  x   04-01

## 2019-04-03 NOTE — PROGRESS NOTE ADULT - ASSESSMENT
33 yo with PGE on AEDs currently with blurred vision and lightheaded/dizziness foudn with demyelinating plaques s/o CNS demyelination.      Plan:  - continue solumedrol 1g IVPB x 5 days with last dose Friday  - f/u CSF results  - f/u NMO abs, MOG abs  - check MRI Cervical and thoracic spine w/w/o gado  - continue keppra 500 mg BID for now  - may d/c with outpt f/u s/p solumedrol complete and f/u with Dr. Lewis in 2 - 4 wks  - no need for steroid taper

## 2019-04-04 LAB — INNER EAR 68KD AB FLD QL: <5 U/L — SIGNIFICANT CHANGE UP

## 2019-04-04 RX ADMIN — PANTOPRAZOLE SODIUM 40 MILLIGRAM(S): 20 TABLET, DELAYED RELEASE ORAL at 08:29

## 2019-04-04 RX ADMIN — Medication 58 MILLIGRAM(S): at 06:41

## 2019-04-04 RX ADMIN — LEVETIRACETAM 500 MILLIGRAM(S): 250 TABLET, FILM COATED ORAL at 17:31

## 2019-04-04 RX ADMIN — LEVETIRACETAM 500 MILLIGRAM(S): 250 TABLET, FILM COATED ORAL at 06:41

## 2019-04-04 NOTE — PROGRESS NOTE ADULT - ASSESSMENT
34 y.o male patient with PMH of epilepsy is being admitted for lumbar puncture and pulse steroids for suspected MS.  History goes back to about 1 week prior to presentation when the patient developed a headache, bi-parietal, with no exacerbating or relieving factors, slightly improved with Tylenol and Excedrin. This type of headache was new to the patient. Headache was associated with lightheadedness and vertigo as well as constant bilateral blurry vision.  He visited his neurologist who ordered an MRI that showed lesions suspicious for demyelinating disease.  He was admitted to the hospital for lumbar puncture and pulse steroids.    # CNS Demyelination, probably MS   - S/p Spinal tap  - F/u NMO abs, MOG abs  -MR Cervical Spine w/wo IV Cont (04.03.19 @ 21:14) >Unremarkable cervical spine MRI.  -MR Thoracic Spine w/wo IV Cont (04.03.19 @ 21:14) >No evidence of abnormal signal or enhancement within the thoracic spinal cord. Small scattered disc bulges and protrusions as described above.  - Pt to receive IV solumedrol 1 gm for 5days, last dose tomorrow    # H/o Epilepsy  - seizure precautions  - c/w keppra    # Transaminitis- resolving  - monitor LFT .     #Pending : completion of pulse dose IV steroids on Friday  # Discussed with Pt daily  # Disposition; Home on 4/5/19

## 2019-04-04 NOTE — PROGRESS NOTE ADULT - ASSESSMENT
34 yoM w/ hx of epilepsy presents for pulse steroids after suspicion for MS because of demyelinating disease seen on the MRI, acute enhancement on MRI suggests acute attack    # Suspected MS  -lumbar tap done, awaiting antibodies  -MRI showin.  Redemonstrated numerous foci of white matter signal abnormality with   a pattern/distribution suggesting demyelinating disease.    2.  Small focus of enhancement is noted along the right aspect of the   anterior body of the corpus callosum suggesting active demyelination,   series 11 image 63 and series 21 image 99.    -started pulse dose steroids, solumedrol IV 1000 for 5 days  -no focal deficits  -MRI cervical/thoracic spine w/wo contrast done awaiting read    # Epilepsy  -keppra 500 bid    PLAN/DISPO home after steroids are finished, today is day 4/5, f/u LP antibodies, will follow up with Dr. Ponce in [place]    DVT/GI ppx not indicated

## 2019-04-04 NOTE — PROGRESS NOTE ADULT - SUBJECTIVE AND OBJECTIVE BOX
Patient is a 34y old  Male who presents with a chief complaint of Suspicion for MS / pulse steroids (03 Apr 2019 09:32)    Patient was seen and examined.  No new events. Denies any complaints  Pt on IV steroids For CNS demyelination, , tolerating meds    PAST MEDICAL & SURGICAL HISTORY:  Epilepsy  H/O hernia repair    Allergies  No Known Allergies    MEDICATIONS  (STANDING):  chlorhexidine 4% Liquid 1 Application(s) Topical <User Schedule>  levETIRAcetam 500 milliGRAM(s) Oral two times a day  methylPREDNISolone sodium succinate IVPB 1000 milliGRAM(s) IV Intermittent daily  pantoprazole    Tablet 40 milliGRAM(s) Oral before breakfast    MEDICATIONS  (PRN):    ICU Vital Signs Last 24 Hrs  T(C): 36.4 (04 Apr 2019 05:47), Max: 36.4 (03 Apr 2019 22:23)  T(F): 97.6 (04 Apr 2019 05:47), Max: 97.6 (03 Apr 2019 22:23)  HR: 57 (04 Apr 2019 05:47) (57 - 83)  BP: 118/61 (04 Apr 2019 05:47) (118/61 - 131/71)  BP(mean): 96 (03 Apr 2019 13:55) (96 - 96)  RR: 18 (04 Apr 2019 05:47) (18 - 20)  SpO2: --    O/E:  Awake, alert, not in distress.  HEENT: atraumatic, EOMI.  Chest: clear.  CVS: SIS2 +, no murmur.  P/A: Soft, BS+  CNS: non focal.  Ext: no edema feet.  Skin: no rash, no ulcers.  All systems reviewed positive findings as above.

## 2019-04-04 NOTE — PROGRESS NOTE ADULT - SUBJECTIVE AND OBJECTIVE BOX
SUBJECTIVE:    Patient is a 34y old Male who presents with a chief complaint of Suspicion for MS / pulse steroids (03 Apr 2019 12:23)      HPI:  34 y.o male patient with PMH of epilepsy is being admitted for lumbar puncture and pulse steroids for suspected MS.    History goes back to about 1 week prior to presentation when the patient developed a headache, bi-parietal, with no exacerbating or relieving factors, slightly improved with Tylenol and Excedrin. This type of headache was new to the patient. Headache was associated with lightheadedness and vertigo as well as constant bilateral blurry vision.  He visited his neurologist who ordered an MRI that showed lesions suspicious for demyelinating disease.  He was admitted to the hospital for lumbar puncture and pulse steroids.    Of note, 2 weeks ago, patient had rhinorrhea and a productive and was prescribed antibiotics. ROS otherwise negative (01 Apr 2019 12:07)      Currently admitted to medicine with the primary diagnosis of      Besides the pertinent positives and negatives described above, the ROS was within normal limits.    PAST MEDICAL & SURGICAL HISTORY  Epilepsy  H/O hernia repair    SOCIAL HISTORY:    ALLERGIES:  No Known Allergies    MEDICATIONS:  STANDING MEDICATIONS  chlorhexidine 4% Liquid 1 Application(s) Topical <User Schedule>  levETIRAcetam 500 milliGRAM(s) Oral two times a day  methylPREDNISolone sodium succinate IVPB 1000 milliGRAM(s) IV Intermittent daily  pantoprazole    Tablet 40 milliGRAM(s) Oral before breakfast    PRN MEDICATIONS    VITALS:   T(F): 97.6  HR: 57  BP: 118/61  RR: 18  SpO2: --    LABS:                        16.8   14.61 )-----------( 254      ( 02 Apr 2019 10:18 )             50.2     04-02    142  |  104  |  14  ----------------------------<  138<H>  4.4   |  23  |  1.0    Ca    10.4<H>      02 Apr 2019 10:18  Mg     2.1     04-02    TPro  7.8  /  Alb  5.1  /  TBili  0.6  /  DBili  0.2  /  AST  34  /  ALT  90<H>  /  AlkPhos  98  04-02                  RADIOLOGY:    PHYSICAL EXAM:  GEN: No acute distress  LUNGS: Clear to auscultation bilaterally   HEART: Regular  ABD: Soft, non-tender, non-distended.  EXT: noncyanotic/NE/2+PP/PALOMO/Skin Intact.   NEURO: AAOX3, nonfocal    Intravenous access: yes  NG tube: no  Flynn Catheter: no

## 2019-04-05 ENCOUNTER — TRANSCRIPTION ENCOUNTER (OUTPATIENT)
Age: 34
End: 2019-04-05

## 2019-04-05 VITALS
SYSTOLIC BLOOD PRESSURE: 108 MMHG | RESPIRATION RATE: 20 BRPM | OXYGEN SATURATION: 96 % | TEMPERATURE: 98 F | DIASTOLIC BLOOD PRESSURE: 58 MMHG | HEART RATE: 74 BPM

## 2019-04-05 RX ADMIN — LEVETIRACETAM 500 MILLIGRAM(S): 250 TABLET, FILM COATED ORAL at 06:07

## 2019-04-05 RX ADMIN — PANTOPRAZOLE SODIUM 40 MILLIGRAM(S): 20 TABLET, DELAYED RELEASE ORAL at 06:07

## 2019-04-05 RX ADMIN — Medication 58 MILLIGRAM(S): at 06:07

## 2019-04-05 NOTE — PROGRESS NOTE ADULT - REASON FOR ADMISSION
Suspicion for MS / pulse steroids

## 2019-04-05 NOTE — DISCHARGE NOTE PROVIDER - NSDCCPCAREPLAN_GEN_ALL_CORE_FT
PRINCIPAL DISCHARGE DIAGNOSIS  Diagnosis: Multiple sclerosis  Assessment and Plan of Treatment: You completed your 5 days of pulse dose steroids which is indicated for an acute MS attack.  You were given this because of the new lesion seen on the MRI.  Please follow up with Dr. Abrams as an outpatient for further treatment as well as your full LP results.      SECONDARY DISCHARGE DIAGNOSES  Diagnosis: Epilepsy  Assessment and Plan of Treatment: please continue with your home medications

## 2019-04-05 NOTE — DISCHARGE NOTE NURSING/CASE MANAGEMENT/SOCIAL WORK - NSDCDPATPORTLINK_GEN_ALL_CORE
You can access the [email or web address], offered by [facility], by registering with the following website: [email or web address]

## 2019-04-05 NOTE — PROGRESS NOTE ADULT - SUBJECTIVE AND OBJECTIVE BOX
Neurology Progress Note    Interval History:  Pt doing well. No new complaints.  Completed IV solumedrol this morning.      PAST MEDICAL & SURGICAL HISTORY:  Epilepsy  H/O hernia repair    Medications:  chlorhexidine 4% Liquid 1 Application(s) Topical <User Schedule>  levETIRAcetam 500 milliGRAM(s) Oral two times a day  pantoprazole    Tablet 40 milliGRAM(s) Oral before breakfast    Vital Signs Last 24 Hrs  T(C): 36.6 (05 Apr 2019 06:08), Max: 36.6 (05 Apr 2019 06:08)  T(F): 97.8 (05 Apr 2019 06:08), Max: 97.8 (05 Apr 2019 06:08)  HR: 74 (05 Apr 2019 06:08) (65 - 77)  BP: 108/58 (05 Apr 2019 06:08) (108/58 - 132/75)  BP(mean): --  RR: 20 (05 Apr 2019 06:08) (18 - 20)  SpO2: 96% (05 Apr 2019 06:08) (96% - 96%)    Neurological Exam:   Mental status: Awake, alert and oriented x3.  Recent and remote memory intact.  Naming, repetition and comprehension intact.  Attention/concentration intact.  No dysarthria, no aphasia.  Fund of knowledge appropriate.    Cranial nerves: Pupils equally round and reactive to light, visual fields full, no nystagmus, extraocular muscles intact, V1 through V3 intact bilaterally and symmetric, face symmetric, hearing intact to finger rub, palate elevation symmetric, tongue was midline.  Motor:  MRC grading 5/5 b/l UE/LE.   strength 5/5.  Normal tone and bulk.  No abnormal movements.    Sensation: Intact to light touch, proprioception, and pinprick.   Coordination: No dysmetria on finger-to-nose and heel-to-shin.  No dysdiadokinesia.  Reflexes: 2+ in bilateral UE/LE, downgoing toes bilaterally. (-) Olivas.  Gait: Narrow and steady. No ataxia.  Romberg negative    Cerebrospinal Fluid Cell Count-1 (04.01.19 @ 14:43)    Total Nucleated Cell Count, CSF: 1 /uL    RBC Count - Spinal Fluid: 3 /uL    CSF Color: Colorless    Tube Type: Tube 1    CSF Appearance: Clear    CSF Segmented Neutrophils: n/a %    Appearance Spun: Colorless    Protein Electrophoresis, Serum (04.01.19 @ 15:17)    Protein Total, Serum: 7.7 g/dL    Total Protein, Serum: 7.7 g/dL    Albumin, Serum: 4.9 g/dL    Alpha 1: 0.2 g/dL    Alpha 2: 0.6 g/dL    Beta Globulin: 0.9 g/dL    Gamma Globulin: 1.0 g/dL    % Albumin: 63.3 %    % Alpha 1: 3.1 %    % Alpha 2: 7.8 %    % Beta: 12.2 %    % Gamma: 13.6 %    Albumin/Globulin Ratio: 1.8 Ratio    Serum Protein Electrophoresis Interp: Normal Electrophoresis Pattern    Protein, CSF (04.01.19 @ 14:43)    Protein, CSF: 33 mg/dL    Angiotensin Converting Enzyme, CSF (04.01.19 @ 14:51)    Angiotensin Converting Enzyme, CSF: <5: Test(s) performed at:      [facility]      Herlinda Rasheed M.D., Ph.D., [place],       [address] [place] [address]      CLIA #[id number] U/L    Sjogren&#x27;s Syndrome Antibodies (04.01.19 @ 15:17)    Anti SS-A Antibody: <0.2 AI    Anti SS-B Antibody: 0.2: Fluorescent Bead Immunoassay   Reference Ranges for SS-A AND SS-B:   <1.0 AI (negative)   > or =1.0 AI (positive)   Reference values apply  to all ages. AI    < from: MR Cervical Spine w/wo IV Cont (04.03.19 @ 21:14) >  IMPRESSION:    Unremarkable cervical spine MRI.    AMOL JONES M.D., ATTENDING RADIOLOGIST  This document has been electronically signed. Apr 4 2019  8:58AM    < end of copied text >    < from: MR Thoracic Spine w/wo IV Cont (04.03.19 @ 21:14) >  IMPRESSION:    1.  No evidence of abnormal signal or enhancement within the thoracic   spinal cord.    2.  Small scattered disc bulges and protrusions as described above.    AMOL JONES M.D., ATTENDING RADIOLOGIST  This document has been electronically signed. Apr 4 2019  8:57AM    < end of copied text >

## 2019-04-05 NOTE — PROGRESS NOTE ADULT - ASSESSMENT
35 yo with PGE on AEDs currently with blurred vision and lightheaded/dizziness foudn with demyelinating plaques s/o CNS demyelination.      Plan:  - continue keppra 500 mg BID for now  - may d/c with outpt f/u with Dr. Lewis in 2 - 4 wks  - has appointment with Dr. Schmid at [facility] for MS evaluation  - no need for steroid taper  - no further inpt neurologic w/u

## 2019-04-05 NOTE — DISCHARGE NOTE PROVIDER - HOSPITAL COURSE
34 yoM w/ hx of epilepsy presents for pulse steroids after suspicion for MS because of demyelinating disease seen on the MRI, acute enhancement on MRI suggests acute attack        # Suspected MS    -lumbar tap done, awaiting antibodies    -MRI showin.  Redemonstrated numerous foci of white matter signal abnormality with     a pattern/distribution suggesting demyelinating disease.        2.  Small focus of enhancement is noted along the right aspect of the     anterior body of the corpus callosum suggesting active demyelination,     series 11 image 63 and series 21 image 99.      -started pulse dose steroids, solumedrol IV 1000 for 5 days    -no focal deficits    -MRI cervical/thoracic spine w/wo contrast done awaiting read        # Epilepsy    -c/w keppra 500 bid        completed 5 days of pulse dose steroids and discharged home with f/u with neuro 34 yoM w/ hx of epilepsy presents for pulse steroids after suspicion for MS because of demyelinating disease seen on the MRI, acute enhancement on MRI suggests acute attack            # CNS Demyelination, probably MS     - S/p Spinal tap    - F/u NMO abs, MOG abs    -MRI Head showing: Redemonstrated numerous foci of white matter signal abnormality with  apattern/distribution suggesting demyelinating disease. Small focus of enhancement is noted along the right aspect of the  anterior body of the corpus callosum suggesting active demyelination, Series 11 image 63 and series 21 image 99.      -MR Cervical Spine w/wo IV Cont (04.03.19 @ 21:14) >Unremarkable cervical spine MRI.    -MR Thoracic Spine w/wo IV Cont (04.03.19 @ 21:14) >No evidence of abnormal signal or enhancement within the thoracic spinal cord. Small scattered disc bulges and protrusions as described above.    - Pt to receive IV solumedrol 1 gm for 5days, last dose tomorrow    - completed 5 days of IV solumedrol    -no focal deficits        # H/o Epilepsy    - seizure precautions    - c/w keppra        # Transaminitis- resolving    - monitor LFT .                 completed 5 days of pulse dose steroids and discharged home with f/u with neuro

## 2019-04-08 LAB — MBP CSF-MCNC: <2 MCG/L — LOW (ref 2–4)

## 2019-04-10 DIAGNOSIS — G35 MULTIPLE SCLEROSIS: ICD-10-CM

## 2019-04-10 DIAGNOSIS — G40.909 EPILEPSY, UNSPECIFIED, NOT INTRACTABLE, WITHOUT STATUS EPILEPTICUS: ICD-10-CM

## 2019-04-10 DIAGNOSIS — R74.0 NONSPECIFIC ELEVATION OF LEVELS OF TRANSAMINASE AND LACTIC ACID DEHYDROGENASE [LDH]: ICD-10-CM

## 2019-04-10 DIAGNOSIS — G36.0 NEUROMYELITIS OPTICA [DEVIC]: ICD-10-CM

## 2019-04-10 LAB — OLIGOCLONAL BANDS CSF ELPH-IMP: PRESENT

## 2019-04-12 ENCOUNTER — APPOINTMENT (OUTPATIENT)
Dept: NEUROLOGY | Facility: CLINIC | Age: 34
End: 2019-04-12
Payer: COMMERCIAL

## 2019-04-12 ENCOUNTER — TRANSCRIPTION ENCOUNTER (OUTPATIENT)
Age: 34
End: 2019-04-12

## 2019-04-12 VITALS
HEIGHT: 72 IN | DIASTOLIC BLOOD PRESSURE: 82 MMHG | SYSTOLIC BLOOD PRESSURE: 125 MMHG | OXYGEN SATURATION: 97 % | HEART RATE: 85 BPM | BODY MASS INDEX: 30.07 KG/M2 | WEIGHT: 222 LBS

## 2019-04-12 LAB — AQP4 H2O CHANNEL AB SERPL IA-ACNC: NEGATIVE — SIGNIFICANT CHANGE UP

## 2019-04-12 PROCEDURE — 99205 OFFICE O/P NEW HI 60 MIN: CPT

## 2019-04-12 NOTE — HISTORY OF PRESENT ILLNESS
[FreeTextEntry1] : Reason for consult: possible MS\par \par HPI: ANTONELLA HUNTLEY is a 34 year old man \par \par 6yo - had generalized seizure\par 13yo-16yo - multiple GTCs\par 16yo - had MRI showing WM lesions. also had SPECT scan shwoing intense focus in R medial temporal lobe. never had LP. \par throughout the years, has had several seizures. characterized by head deviation to the left. aura of vision loss, moving eyes in all directions, palpitations, sweating. \par last time he had a seizure was 2016 thought to be provoked by lack of sleep. \par childhood development was normal.\par On keppra since 2011.\par \par 3/2019 - abrupt blurred vision in both eyes X30min, along with intractable "migraine" and dizziness and some slurred speech, lasting for two weeks. went to see optometrist who advised pt to see neurologist Dr. Austin. Dr. Austin noted gait imbalance and sent to [facility], had MRI brain showing lesions, with a ruy+ lesion as well. Got 5d IVMP at [facility]. Sx improved after IVMP. Currently without headache or dizziness. \par \par Reports that he has occ chronic headaches, less than 1/month, occ takes OTCs with good effect.\par \par Never had prior episodes of unexplained neurological sx.\par \par ROS/Current Sx:\par 10 point ROS reviewed and scanned.\par \par PMHX:\par epilepsy\par \par MEDS:\par keppra 500mg bid\par \par ALL: nkda\par \par SHx: no tob, occ etoh, no drugs. 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 father with epilepsy that began as young adult.\par \par Vitals: unremarkable\par \par Exam:\par \par AO3.  Normally conversant.  Follows commands, names, and repeats.  Good attention.\par \par PERRL, VFF, EOMI, no nystagmus, face symmetric, TUP at midline.\par \par Motor: \par                                                 R:                               L:\par Del                                           5                                5\par Bi                                              5                               5\par Tri                                            5                               5\par Wrist Extensors                      5                               5\par Finger abductors                    5                               5\par                                         5                               5 \par \par HF                                           5                               5\par KE                                           5                               5\par KF                                           5                               5\par DF                                           5                               5\par PF                                           5                               5\par \par Tone                                       R                               L\par UE                                          0                                0 \par LE                                          0                                0\par \par Sensory                                RUE                      LUE                 RLE                LLE     \par LT                                           +                            +                      +                   +\par Vib                                          +                            +                      +                   +\par JPS                                         +                            +                      +                   +\par PP                                         +                            +                      +                   +\par Temp                                     +                            +                      +                   +\par \par Reflexes:\par                                              R                             L                            \par Biceps                                  2                             2\par BR                                        2                             2\par Triceps                                2                              2\par Pat                                        2                            2 \par AJ                                        2                             2\par \par TOES                                    F                            F\par \par \par Coordination:\par                                              R                             L                       \par FTN                                       0                             0 \par BOSTON                                      0                            0\par HTS                                      0                             0 \par \par Other                                                                          \par  \par Gait: normal, can heel, toe, tandem\par \par                     Assistance: none\par \par 4/2019\par ESR wnl\par PAULINA neg\par ssa/ssb neg\par ACE neg\par \par CSF 4/2019\par WBC 1\par glucose 64\par +OCBs\par prot 33\par igg index/synth wnl\par \par MRI brain 4/2019 - mod to severe wm lesion burden. pattern is certainly consistent with MS in the right setting, with radially oriented PV lesions and several clear MAISHA lesions. Read as small enhancing CC lesion, though I am not sure whether this is a vessel.\par MRA head 4/2019 - unremarkable\par MRI C+T 4/2019 - no lesions\par \par \par AP: 33yo w/ epilepsy throughout his life, without sx that are clearly attributable to MS, with MRI at age 15 demonstrating WM lesions, recent episode of transient BL blurry vision X30m and HA and dizziness lasting weeks, MRI again demonstrating lesions but read as having ruy+ lesion. I am not convinced that ruy+ lesion is not a vessel, and will review with neuroradiology.\par \par While brain MRI findings are consistent with MS in the right setting, the pt has not had episodes that are clearly attributable to MS. Fleeting BL blurry vision is of unclear etiology but is not indicative of optic neuropathy as this would not be fleeting; will refer to Dr. Saez of neuro-opthalmology. Dizziness and HA are nonspecific and pt's MRI did not demonstrate a posterior fossa lesion to correlate to these symptoms. Hence, would classify this as RIS (MAISHA, PV, >9 lesions altogether, ?ruy+ lesion). Would recommend close monitoring with serial MRIs to exlude new lesions, at which point would recommend rx with DMT.\par \par - will review with neuroradiology regarding ? ruy+ lesion. ***\par - referral to Dr. Saez.\par - plan for MRI brain repeat in 4/2019, if stable in 4/2020.\par - will discuss with Dr. Austin\par \par \par \par

## 2019-04-17 LAB — B BURGDOR AB CSF-ACNC: SIGNIFICANT CHANGE UP

## 2019-04-19 ENCOUNTER — APPOINTMENT (OUTPATIENT)
Dept: OPHTHALMOLOGY | Facility: CLINIC | Age: 34
End: 2019-04-19
Payer: COMMERCIAL

## 2019-04-19 DIAGNOSIS — H53.30 UNSPECIFIED DISORDER OF BINOCULAR VISION: ICD-10-CM

## 2019-04-19 DIAGNOSIS — H43.391 OTHER VITREOUS OPACITIES, RIGHT EYE: ICD-10-CM

## 2019-04-19 PROCEDURE — 92083 EXTENDED VISUAL FIELD XM: CPT

## 2019-04-19 PROCEDURE — 99244 OFF/OP CNSLTJ NEW/EST MOD 40: CPT

## 2019-04-19 PROCEDURE — 92134 CPTRZ OPH DX IMG PST SGM RTA: CPT

## 2019-05-15 ENCOUNTER — APPOINTMENT (OUTPATIENT)
Dept: NEUROLOGY | Facility: CLINIC | Age: 34
End: 2019-05-15

## 2019-05-24 ENCOUNTER — RECORD ABSTRACTING (OUTPATIENT)
Age: 34
End: 2019-05-24

## 2019-05-24 DIAGNOSIS — Z78.9 OTHER SPECIFIED HEALTH STATUS: ICD-10-CM

## 2019-05-24 DIAGNOSIS — G40.919 EPILEPSY, UNSPECIFIED, INTRACTABLE, W/OUT STATUS EPILEPTICUS: ICD-10-CM

## 2019-05-24 DIAGNOSIS — Z86.79 PERSONAL HISTORY OF OTHER DISEASES OF THE CIRCULATORY SYSTEM: ICD-10-CM

## 2019-05-24 DIAGNOSIS — Z84.89 FAMILY HISTORY OF OTHER SPECIFIED CONDITIONS: ICD-10-CM

## 2019-07-30 ENCOUNTER — APPOINTMENT (OUTPATIENT)
Dept: NEUROLOGY | Facility: CLINIC | Age: 34
End: 2019-07-30
Payer: COMMERCIAL

## 2019-07-30 VITALS
OXYGEN SATURATION: 99 % | HEIGHT: 75 IN | BODY MASS INDEX: 27.98 KG/M2 | HEART RATE: 82 BPM | DIASTOLIC BLOOD PRESSURE: 80 MMHG | SYSTOLIC BLOOD PRESSURE: 125 MMHG | TEMPERATURE: 95.3 F | WEIGHT: 225 LBS

## 2019-07-30 DIAGNOSIS — G40.309 GENERALIZED IDIOPATHIC EPILEPSY AND EPILEPTIC SYNDROMES, NOT INTRACTABLE, W/OUT STATUS EPILEPTICUS: ICD-10-CM

## 2019-07-30 PROCEDURE — 99214 OFFICE O/P EST MOD 30 MIN: CPT

## 2019-07-31 PROBLEM — G40.309 PRIMARY GENERALIZED EPILEPSY: Status: ACTIVE | Noted: 2019-05-24

## 2019-07-31 NOTE — ASSESSMENT
[FreeTextEntry1] : Shahbaz is here for followup in regard to his seizures he is doing very well and considering to symptoms that he exhibited in March 2019 he has been doing also very well. I believe it is less likely to consider to sensory symptoms that he has today to multiple sclerosis or any CNS event there is very good possibility based on the pattern and presentation that it is peripheral and cervical in origin however in his case he should be more careful considering what happened. He is supposed to see the MS specialist after his MRI I will schedule him to do an MRI of the brain and cervical and thoracic spine with and without gadolinium he will talk after having the result and I will see him in followup.

## 2019-07-31 NOTE — HISTORY OF PRESENT ILLNESS
[FreeTextEntry1] : Shahbaz is here for followup. He was seen last by me in March 2019. This was his last visit however he has been following up with me for a few years. He came to me from [facility]. At the time he was carrying the diagnosis of seizure disorder the events described as a deep muscle and generalized seizures as starting from age 5. At that time he was on Keppra and Trileptal. His concern then was the impact of medication on fertility.\par At the time reported the his MRI was normal done prior to that which appears not to be true based on the current reviews and the copies of the reports that he provided us with just recently.\par The video EEG monitoring done at that time was more suggestive of a generalized seizure .\par Throughout the years they took him off the Trileptal he states on Keppra and he did very well with the seizures he has had a successful  and advanced in the ranking.\par In March 2019 he did have a new onset of symptoms described as mainly vertigo and blurriness of vision. Based on that we had repeated his MRI that showed multiple white matter lesions suggestive of demyelinating disease. In fact there was also a slightly enhancing lesion along the right aspect of the anterior body of the corpus callosum.\par Having these issue of prior MRIs with her raise and as it appears he did have an MRI done when he was 15 years old showing white matter lesions and he also had a SPECT scan done at that time that showed right mesial temporal lobe increased signal.\par Based on these new information and findings he had done also spinal tap looking for inflammatory causes of came back negative. I have asked him to see an [facility].\par Based on the note that I have from in his decision at this point was that by brain MRI findings were consistent with multiple sclerosis in the right sitting the patient had episodes that are not clearly consistent with diagnosis of multiple sclerosis. His decision was to repeat the MRI of the brain sometime in September and also to assess for a ophthalmology consult.\par According to Shahbaz since that visit in April 2019 he has been doing very well at his baseline no symptoms. Today for the first time after decrease time he does have a slight unusual symptom basically described as numbness or lower aspect of the right hand including the right fifth finger and ulnar aspect of the forearm.\par Otherwise he is at his baseline and not having any symptoms. He says during this last weekend he didn't do a lot of work around the house. Otherwise there was no change

## 2019-08-13 ENCOUNTER — OUTPATIENT (OUTPATIENT)
Dept: OUTPATIENT SERVICES | Facility: HOSPITAL | Age: 34
LOS: 1 days | Discharge: HOME | End: 2019-08-13
Payer: COMMERCIAL

## 2019-08-13 DIAGNOSIS — M54.2 CERVICALGIA: ICD-10-CM

## 2019-08-13 DIAGNOSIS — G37.9 DEMYELINATING DISEASE OF CENTRAL NERVOUS SYSTEM, UNSPECIFIED: ICD-10-CM

## 2019-08-13 DIAGNOSIS — Z98.890 OTHER SPECIFIED POSTPROCEDURAL STATES: Chronic | ICD-10-CM

## 2019-08-13 PROCEDURE — 70553 MRI BRAIN STEM W/O & W/DYE: CPT | Mod: 26

## 2019-08-13 PROCEDURE — 72156 MRI NECK SPINE W/O & W/DYE: CPT | Mod: 26

## 2019-08-14 ENCOUNTER — OUTPATIENT (OUTPATIENT)
Dept: OUTPATIENT SERVICES | Facility: HOSPITAL | Age: 34
LOS: 1 days | Discharge: HOME | End: 2019-08-14
Payer: COMMERCIAL

## 2019-08-14 DIAGNOSIS — Z98.890 OTHER SPECIFIED POSTPROCEDURAL STATES: Chronic | ICD-10-CM

## 2019-08-14 DIAGNOSIS — G37.9 DEMYELINATING DISEASE OF CENTRAL NERVOUS SYSTEM, UNSPECIFIED: ICD-10-CM

## 2019-08-14 PROCEDURE — 72157 MRI CHEST SPINE W/O & W/DYE: CPT | Mod: 26

## 2019-09-05 ENCOUNTER — APPOINTMENT (OUTPATIENT)
Dept: NEUROLOGY | Facility: CLINIC | Age: 34
End: 2019-09-05
Payer: COMMERCIAL

## 2019-09-05 VITALS
SYSTOLIC BLOOD PRESSURE: 118 MMHG | OXYGEN SATURATION: 97 % | HEIGHT: 75 IN | HEART RATE: 81 BPM | BODY MASS INDEX: 28.6 KG/M2 | DIASTOLIC BLOOD PRESSURE: 75 MMHG | WEIGHT: 230 LBS

## 2019-09-05 PROCEDURE — 99214 OFFICE O/P EST MOD 30 MIN: CPT

## 2019-09-05 NOTE — HISTORY OF PRESENT ILLNESS
[FreeTextEntry1] : Initial hx 4/2019\par 4yo - had generalized seizure\par 11yo-14yo - multiple GTCs\par 14yo - had MRI showing WM lesions. also had SPECT scan shwoing intense focus in R medial temporal lobe. never had LP. \par throughout the years, has had several seizures. characterized by head deviation to the left. aura of vision loss, moving eyes in all directions, palpitations, sweating. \par last time he had a seizure was 2016 thought to be provoked by lack of sleep. \par childhood development was normal.\par On keppra since 2011.\par 3/2019 - abrupt blurred vision in both eyes X30min, along with intractable "migraine" and dizziness and some slurred speech, lasting for two weeks. went to see his sister, an optometrist, who advised pt to see neurologist Dr. Austin. Dr. Austin noted gait imbalance and sent to [facility], had MRI brain showing lesions, with a ruy+ lesion as well. Got 5d IVMP at [facility]. Sx improved after IVMP. Currently without headache or dizziness. \par Reports that he has occ chronic headaches, less than 1/month, occ takes OTCs with good effect.\par Never had prior episodes of unexplained neurological sx.\par \par Subj interval:\par \par No new symptoms. Feels at baseline.\par \par PMHX:\par epilepsy\par \par MEDS:\par keppra 500mg bid\par \par O:\par AO3. Normally conversant. Follows commands, names, and repeats. Good attention.\par \par PERRL, VFF, EOMI, no nystagmus, face symmetric, TUP at midline.\par \par Motor: \par    R:  L:\par Del   5  5\par Bi   5  5\par Tri   5  5\par Wrist Extensors  5  5\par Finger abductors  5  5\par    5  5 \par \par HF   5  5\par KE   5  5\par KF   5  5\par DF   5  5\par PF   5  5\par \par Tone   R  L\par UE   0  0 \par LE   0  0\par \par Sensory  RUE  LUE  RLE LLE \par LT   +  +  +  +\par Vib   +  +  +  +\par JPS   +  +  +  +\par PP   +  +  +  +\par Temp   +  +  +  +\par \par Reflexes:\par    R  L  \par Biceps   2  2\par BR   2  2\par Triceps  2  2\par Pat   2  2 \par AJ   2  2\par \par TOES   F  F\par \par Coordination:\par    R  L  \par FTN   0  0 \par BOSTON   0  0\par HTS   0  0 \par \par Other     \par  \par Gait: normal, can heel, toe, tandem\par \par   Assistance: none\par \par 4/2019\par ESR wnl\par PAULINA neg\par ssa/ssb neg\par ACE neg\par \par CSF 4/2019\par WBC 1\par glucose 64\par +OCBs\par prot 33\par igg index/synth wnl\par \par MRI brain 4/2019 - mod to severe wm lesion burden. pattern is certainly consistent with MS in the right setting, with radially oriented PV lesions and several clear MAISHA lesions. Read as small enhancing CC lesion - discussed with neuroradiology and confirmed.\par MRA head 4/2019 - unremarkable\par MRI C+T 4/2019 - no lesions\par \par MRI brain 8/2019 - read as stable brain MRI but ?one R occ horn lesion that was not clearly present on prior, but can only see this on saggital, not on axial, therefore likely an artifact.\par \par AP: 35yo w/ epilepsy throughout his life, without sx that are clearly attributable to MS, with MRI at age 15 demonstrating WM lesions, recent episode of transient BL blurry vision X30m and HA and dizziness lasting weeks, MRI again demonstrating lesions w one ruy+ CC lesion.\par \par While brain MRI findings are consistent with MS in the right setting, the pt has not had episodes that are clearly attributable to MS. Fleeting BL blurry vision is of unclear etiology but is not indicative of optic neuropathy as this would not be fleeting; neuro-ophthalmology work up was negative. Dizziness and HA are nonspecific and pt's MRI did not demonstrate a posterior fossa lesion to correlate to these symptoms. Hence, would still classify this as RIS (MAISHA, PV, >9 lesions altogether, ruy+ lesion). \par \par MRIs in 8/2019 likely stable. \par \par - repeat MRI brain in 2/2020\par - RTC after new brain MRI\par \par \par

## 2019-11-12 ENCOUNTER — APPOINTMENT (OUTPATIENT)
Dept: NEUROLOGY | Facility: CLINIC | Age: 34
End: 2019-11-12
Payer: COMMERCIAL

## 2019-11-12 VITALS
HEART RATE: 69 BPM | TEMPERATURE: 98.6 F | OXYGEN SATURATION: 100 % | BODY MASS INDEX: 28.6 KG/M2 | DIASTOLIC BLOOD PRESSURE: 66 MMHG | HEIGHT: 75 IN | WEIGHT: 230 LBS | SYSTOLIC BLOOD PRESSURE: 126 MMHG

## 2019-11-12 PROCEDURE — 99214 OFFICE O/P EST MOD 30 MIN: CPT

## 2019-11-13 NOTE — HISTORY OF PRESENT ILLNESS
[FreeTextEntry1] : Shahbaz is here for followup we his wife. He does not report any implants except one brief periods of feeling as slightly dizzy or foggy did not clear evidence of confusion or change in the lesion. Otherwise he is absolutely at his baseline. Does not report any change in his ability for cognitive function. His vision and dexterity and bladder function and gait is also stable.\par He did have an MRI of the cervical spine be done without contrast is normal the MRI of the brain again with and without contrast shows multiple lesions suggestive of demyelinating process without a change from prior MRI.\par He continues to be quite functional and active at this point he has to visit one step at a  and for whatever reason he also gained a few pounds. There are occasions that he might feel a slight low back pain including one that woke him up from sleep. Otherwise for the most part he denies having any mechanical or muscular pain or any radiating pain to the upper or lower extremities. No history of headache rash. His ability to appreciate colors is full.

## 2019-11-13 NOTE — PHYSICAL EXAM
[FreeTextEntry1] : Alert and awake oriented x3 follows commands crosses the midline well. His attention and concentration is normal executive behavior is normal and language and memory recall is normal. The visual acuity is normal color and appreciation is normal there is no field cut.\par The pubis are reactive and normal size. The extraocular movements are normal.\par The motor exam shows no drift fine motor activities normal. She is able to stand up without using his hands. The reflexes are symmetrical and present except at the ankle is slightly hyper\par The gait is stable Romberg is negative

## 2019-11-13 NOTE — ASSESSMENT
[FreeTextEntry1] : Shahbaz is here for followup. He does have history of epilepsy and also MRI and history suggestive of a central process such as demyelinating disease. He did have extensive workup for other possibilities in the differential diagnoses all came back negative.\par In my opinion considering the symptomatic nature of the condition and also MRI finding the risk and benefit assessment is in favor of treatment. He is having his followup with Dr. Thomas in March 2020 I will coordinate doing another MRI prior to that visit with 's office. Meanwhile we will continue his seizure medications and I will level and follow.

## 2020-02-09 ENCOUNTER — FORM ENCOUNTER (OUTPATIENT)
Age: 35
End: 2020-02-09

## 2020-02-10 ENCOUNTER — OUTPATIENT (OUTPATIENT)
Dept: OUTPATIENT SERVICES | Facility: HOSPITAL | Age: 35
LOS: 1 days | Discharge: HOME | End: 2020-02-10
Payer: COMMERCIAL

## 2020-02-10 DIAGNOSIS — G37.9 DEMYELINATING DISEASE OF CENTRAL NERVOUS SYSTEM, UNSPECIFIED: ICD-10-CM

## 2020-02-10 DIAGNOSIS — Z98.890 OTHER SPECIFIED POSTPROCEDURAL STATES: Chronic | ICD-10-CM

## 2020-02-10 PROCEDURE — 70553 MRI BRAIN STEM W/O & W/DYE: CPT | Mod: 26

## 2020-03-06 ENCOUNTER — APPOINTMENT (OUTPATIENT)
Dept: NEUROLOGY | Facility: CLINIC | Age: 35
End: 2020-03-06
Payer: COMMERCIAL

## 2020-03-06 VITALS
DIASTOLIC BLOOD PRESSURE: 85 MMHG | OXYGEN SATURATION: 97 % | WEIGHT: 227 LBS | BODY MASS INDEX: 28.23 KG/M2 | TEMPERATURE: 98.4 F | HEIGHT: 75 IN | SYSTOLIC BLOOD PRESSURE: 147 MMHG | HEART RATE: 67 BPM

## 2020-03-06 PROCEDURE — 99214 OFFICE O/P EST MOD 30 MIN: CPT

## 2020-03-06 NOTE — HISTORY OF PRESENT ILLNESS
[FreeTextEntry1] : Initial hx 4/2019\par 6yo - had generalized seizure\par 13yo-16yo - multiple GTCs\par 16yo - had MRI showing WM lesions. also had SPECT scan showing intense focus in R medial temporal lobe. never had LP. \par throughout the years, has had several seizures. characterized by head deviation to the left. aura of vision loss, moving eyes in all directions, palpitations, sweating. \par last time he had a seizure was 2016 thought to be provoked by lack of sleep. \par childhood development was normal.\par On keppra since 2011.\par 3/2019 - abrupt blurred vision in both eyes X30min, along with intractable "migraine" and dizziness and some slurred speech, lasting for two weeks. went to see his sister, an optometrist, who advised pt to see neurologist Dr. Austin. Dr. Austin noted gait imbalance and sent to [facility], had MRI brain showing lesions, with a ruy+ lesion as well. Got 5d IVMP at [facility]. Sx improved after IVMP. Currently without headache or dizziness. \par Reports that he has occ chronic headaches, less than 1/month, occ takes OTCs with good effect.\par Never had prior episodes of unexplained neurological sx.\par \par Subj interval:\par \par No new symptoms. Feels well. Rare HA.\par \par Thesis on development of biology as high school subject.\par \par Wife is 5 months pregnant with a baby girl.\par \par PMHX:\par epilepsy\par \par MEDS:\par keppra 500mg bid\par \par O:\par AO3. Normally conversant. Follows commands, names, and repeats. Good attention.\par \par PERRL, VFF, EOMI, no nystagmus, face symmetric, TUP at midline.\par \par Motor: \par  R: L:\par Del 5 5\par Bi 5 5\par Tri 5 5\par Wrist Extensors 5 5\par Finger abductors 5 5\par  5 5 \par \par HF 5 5\par KE 5 5\par KF 5 5\par DF 5 5\par PF 5 5\par \par Tone R L\par UE 0 0 \par LE 0 0\par \par Sensory RUE LUE RLE LLE \par LT + + + +\par Vib + + + +\par JPS + + + +\par PP + + + +\par Temp + + + +\par \par Reflexes:\par  R L \par Biceps 2 2\par BR 2 2\par Triceps 2 2\par Pat 2 2 \par AJ 2 2\par \par TOES F F\par \par Coordination:\par  R L \par FTN 0 0 \par BOSTON 0 0\par HTS 0 0 \par \par Other \par  \par Gait: normal, can heel, toe, tandem\par \par  Assistance: none\par \par 4/2019\par ESR wnl\par PAULINA neg\par ssa/ssb neg\par ACE neg\par \par CSF 4/2019\par WBC 1\par glucose 64\par +OCBs\par prot 33\par igg index/synth wnl\par \par MRI brain 3/2019 - mod to severe wm lesion burden. pattern is certainly consistent with MS in the right setting, with radially oriented PV lesions and several clear MAISHA lesions. Read as small enhancing CC lesion - discussed with neuroradiology and confirmed.\par MRA head 3/2019 - unremarkable\par MRI C+T 3/2019 - no lesions\par \par MRI brain 8/2019 - read as stable brain MRI but ?one R occ horn lesion that was not clearly present on prior, but can only see this on saggital, not on axial, therefore likely an artifact.\par \par MRI brain 2/2020 - reviewed, unchanged from 8/2019.\par \par AP: 35yo w/ epilepsy throughout his life, without sx that are clearly attributable to MS, with MRI at age 15 demonstrating WM lesions, recent episode of transient BL blurry vision X30m and HA and dizziness lasting weeks, MRI again demonstrating lesions w one ruy+ CC lesion.\par \par While brain MRI findings are consistent with MS in the right setting, the pt has not had episodes that are clearly attributable to MS. Fleeting BL blurry vision is of unclear etiology but is not indicative of optic neuropathy as this would not be fleeting; neuro-ophthalmology work up was negative. Dizziness and HA are nonspecific and pt's MRI did not demonstrate a posterior fossa lesion to correlate to these symptoms. Hence, would still classify this as RIS (MAISHA, PV, >9 lesions altogether, ruy+ lesion). \par \par MRIs have been stable.\par \par - cont to hold off on DMTs\par - repeat MRI brain in 8/2020 (to be done at [facility])\par - RTC after new brain MRI\par

## 2020-04-19 NOTE — PHYSICAL EXAM
[FreeTextEntry1] : He is awake alert oriented x3 follows first step commands crosses the midline low he is in a good mood his concentration and attention is normal the language is intact overall demented status is normal\par Cranial nerve exam is normal there is no double vision no blurriness of vision color appreciation is normal.\par The motor exam is also normal his sensory exam does not show decreased sensation or cortical sensory modalities. Motor exam shows normal  and is able to walk tippytoe's and on his heels. His tandem walking is normal Romberg is negative. Vladimir Shakeel

## 2020-05-26 ENCOUNTER — APPOINTMENT (OUTPATIENT)
Dept: NEUROLOGY | Facility: CLINIC | Age: 35
End: 2020-05-26
Payer: COMMERCIAL

## 2020-05-26 PROCEDURE — 99214 OFFICE O/P EST MOD 30 MIN: CPT | Mod: 95

## 2020-05-27 NOTE — HISTORY OF PRESENT ILLNESS
[Home] : at home, [unfilled] , at the time of the visit. [Medical Office: ([facility])___] : at the medical office located in  [FreeTextEntry1] : Shahbaz agreed with this telehealth visit. has no questions.\tin Works from home and seems to be quite happy. 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 Denies any change in his moods and memory.No dizzy spells. no spinal pain. no weakness. no issues with balance and B/B function.\par No double vision.normal color appriciation\tin Did not gain weight.\par He did have a repeat MRI done in the Feb 2020 that was stable with no change.\tin Saw dr Singletary in [facility] multiple sclerosis clinic.Is als o supposed to repeat MRI in august and to have a F/U visit

## 2020-05-27 NOTE — PHYSICAL EXAM
[FreeTextEntry1] : A/A/Ox3\par in good moods \par Follows 4 step commands.\par good attention and concentration\par normal executive behavior\par Full R.O.M of EOM\par no nystagmus\par No double vision\par other CN are intact\par No drift . no fixation,normal fine motor\par No dysmetria\par Able to stand up on one foot\par

## 2020-05-27 NOTE — ASSESSMENT
[FreeTextEntry1] : Well controlled epilepsy \par \par MRI lesions and also period of dizziness and visual symptoms over all suggestive of demyelinating disease \par \par \par Plan.\par continue the Keppra and Vit D\par levels\par F/U with Dr Singletary

## 2020-06-25 LAB — 25(OH)D3 SERPL-MCNC: 42 NG/ML

## 2020-06-28 LAB — LEVETIRACETAM SERPL-MCNC: 5 MCG/ML

## 2020-06-30 ENCOUNTER — APPOINTMENT (OUTPATIENT)
Dept: NEUROLOGY | Facility: CLINIC | Age: 35
End: 2020-06-30
Payer: COMMERCIAL

## 2020-06-30 PROCEDURE — 95816 EEG AWAKE AND DROWSY: CPT

## 2020-08-19 ENCOUNTER — OUTPATIENT (OUTPATIENT)
Dept: OUTPATIENT SERVICES | Facility: HOSPITAL | Age: 35
LOS: 1 days | End: 2020-08-19

## 2020-08-19 ENCOUNTER — APPOINTMENT (OUTPATIENT)
Dept: MRI IMAGING | Facility: CLINIC | Age: 35
End: 2020-08-19
Payer: COMMERCIAL

## 2020-08-19 ENCOUNTER — RESULT REVIEW (OUTPATIENT)
Age: 35
End: 2020-08-19

## 2020-08-19 DIAGNOSIS — Z98.890 OTHER SPECIFIED POSTPROCEDURAL STATES: Chronic | ICD-10-CM

## 2020-08-19 PROCEDURE — 70553 MRI BRAIN STEM W/O & W/DYE: CPT | Mod: 26

## 2020-09-11 ENCOUNTER — APPOINTMENT (OUTPATIENT)
Dept: NEUROLOGY | Facility: CLINIC | Age: 35
End: 2020-09-11
Payer: COMMERCIAL

## 2020-09-11 VITALS
TEMPERATURE: 99 F | DIASTOLIC BLOOD PRESSURE: 76 MMHG | HEART RATE: 81 BPM | SYSTOLIC BLOOD PRESSURE: 115 MMHG | OXYGEN SATURATION: 96 % | BODY MASS INDEX: 27.98 KG/M2 | WEIGHT: 225 LBS | HEIGHT: 75 IN

## 2020-09-11 PROCEDURE — 99214 OFFICE O/P EST MOD 30 MIN: CPT

## 2020-09-11 NOTE — HISTORY OF PRESENT ILLNESS
[FreeTextEntry1] : Initial hx 4/2019\par 6yo - had generalized seizure\par 13yo-14yo - multiple GTCs\par 14yo - had MRI showing WM lesions. also had SPECT scan showing intense focus in R medial temporal lobe. never had LP. \par throughout the years, has had several seizures. characterized by head deviation to the left. aura of vision loss, moving eyes in all directions, palpitations, sweating. \par last time he had a seizure was 2016 thought to be provoked by lack of sleep. \par childhood development was normal.\par On keppra since 2011.\par 3/2019 - abrupt blurred vision in both eyes X30min, along with intractable "migraine" and dizziness and some slurred speech, lasting for two weeks. went to see his sister, an optometrist, who advised pt to see neurologist Dr. Austin. Dr. Austin noted gait imbalance and sent to [facility], had MRI brain showing lesions, with a ruy+ lesion as well. Got 5d IVMP at [facility]. Sx improved after IVMP. Currently without headache or dizziness. \par Reports that he has occ chronic headaches, less than 1/month, occ takes OTCs with good effect.\par Never had prior episodes of unexplained neurological sx.\par \par Subj interval:\par \par No new symptoms. Feels well. No HA. Had some lightheadedness but transient and hasn't recurred. \par \par Thesis on development of biology as high school subject. Going to defend in 12/2020.\par \par Wife just had a baby girl.\par \par PMHX:\par epilepsy\par \par MEDS:\par keppra 500mg bid since clinical trial for keppra\par \par O:\par AO3. Normally conversant. Follows commands, names, and repeats. Good attention.\par \par PERRL, VFF, EOMI, no nystagmus, face symmetric, TUP at midline.\par \par Motor: \par  R: L:\par Del 5 5\par Bi 5 5\par Tri 5 5\par Wrist Extensors 5 5\par Finger abductors 5 5\par  5 5 \par \par HF 5 5\par KE 5 5\par KF 5 5\par DF 5 5\par PF 5 5\par \par Tone R L\par UE 0 0 \par LE 0 0\par \par Sensory RUE LUE RLE LLE \par LT + + + +\par Vib + + + +\par JPS + + + +\par PP + + + +\par Temp + + + +\par \par Reflexes:\par  R L \par Biceps 2 2\par BR 2 2\par Triceps 2 2\par Pat 2 2 \par AJ 2 2\par \par TOES F F\par \par Coordination:\par  R L \par FTN 0 0 \par BOSTON 0 0\par HTS 0 0 \par \par Other \par  \par Gait: normal, can heel, toe, tandem\par \par  Assistance: none\par \par 4/2019\par ESR wnl\par PAULINA neg\par ssa/ssb neg\par ACE neg\par \par CSF 4/2019\par WBC 1\par glucose 64\par +OCBs\par prot 33\par igg index/synth wnl\par \par MRI brain 3/2019 - mod to severe wm lesion burden. pattern is certainly consistent with MS in the right setting, with radially oriented PV lesions and several clear MAISHA lesions. Read as small enhancing CC lesion - discussed with neuroradiology and confirmed.\par MRA head 3/2019 - unremarkable\par MRI C+T 3/2019 - no lesions\par \par MRI brain 8/2019 - read as stable brain MRI but ?one R occ horn lesion that was not clearly present on prior, but can only see this on saggital, not on axial, therefore likely an artifact.\par \par MRI brain 2/2020 - reviewed, unchanged from 8/2019.\par \par MRI brain 8/2020 - reviewed with neuroradiology, while read as having two small new foci, these were definitely present on prior scan, seen best on sagittal images in 2/2020. \par \par \par AP: 33yo w/ epilepsy throughout his life, without sx that are clearly attributable to MS, with MRI at age 15 demonstrating WM lesions, recent episode of transient BL blurry vision X30m and HA and dizziness lasting weeks, MRI again demonstrating lesions w one ruy+ CC lesion.\par \par While brain MRI findings are consistent with MS in the right setting, the pt has not had episodes that are clearly attributable to MS. Fleeting BL blurry vision is of unclear etiology but is not indicative of optic neuropathy as this would not be fleeting; neuro-ophthalmology work up was negative. Dizziness and HA are nonspecific and pt's MRI did not demonstrate a posterior fossa lesion to correlate to these symptoms. Hence, would still classify this as RIS (MAISHA, PV, >9 lesions altogether, ruy+ lesion). \par \par MRIs have been stable.\par \par - cont to hold off on DMTs\par - repeat MRI brain wo contrast in 2/2021 (to be done at [facility])\par - RTC 2/2021 after new MRI

## 2020-11-18 ENCOUNTER — APPOINTMENT (OUTPATIENT)
Dept: NEUROLOGY | Facility: CLINIC | Age: 35
End: 2020-11-18
Payer: COMMERCIAL

## 2020-11-18 VITALS
SYSTOLIC BLOOD PRESSURE: 120 MMHG | DIASTOLIC BLOOD PRESSURE: 80 MMHG | OXYGEN SATURATION: 98 % | HEART RATE: 80 BPM | WEIGHT: 225 LBS | BODY MASS INDEX: 27.98 KG/M2 | TEMPERATURE: 97.2 F | HEIGHT: 75 IN

## 2020-11-18 PROCEDURE — 99214 OFFICE O/P EST MOD 30 MIN: CPT

## 2020-11-18 NOTE — HISTORY OF PRESENT ILLNESS
[FreeTextEntry1] : Shahbaz is here for the F/U.  He is now  brand new father of a very cute 4 months old girl.\par He is very much at the baseline . not complaining of any new symptoms. He did his MRI and also saw [facility]. The outcome was very good . with the impression of no change and carrying the diagnosis of RIS.\par He denies having any change in his moods and memory and level of function.\par No change in balance and bladder and visual function\par He has had no events suspicious for seizure.\par His EEG done in June was normal..\par He will repeat his MRI in Feb and after that annually.\par He did have a blood test done with his PMD. All good .his LDL was 125 and he is being careful with his diet.\par He sleeps well. Fortunately the baby sleeps straight 8 hours

## 2020-11-18 NOTE — PHYSICAL EXAM
[FreeTextEntry1] : A/A/Ox3\par good moods\par good attention\par normal language\par normal executive behavior\par full R.O.M of EOM\par no nystagmus\par pupils reactive to light, equal\par no drift\par no dysmetria\par stable gait\par able to tandem walk\par negative Romberg

## 2020-11-18 NOTE — ASSESSMENT
[FreeTextEntry1] : generalized epilepsy.\par \par demyelinating disease / RIS\par \par plan.\par levels and F/U\par F/u with Dr Singletary

## 2020-12-07 ENCOUNTER — RX RENEWAL (OUTPATIENT)
Age: 35
End: 2020-12-07

## 2020-12-31 ENCOUNTER — RX RENEWAL (OUTPATIENT)
Age: 35
End: 2020-12-31

## 2021-02-12 ENCOUNTER — APPOINTMENT (OUTPATIENT)
Dept: MRI IMAGING | Facility: CLINIC | Age: 36
End: 2021-02-12
Payer: COMMERCIAL

## 2021-02-12 ENCOUNTER — NON-APPOINTMENT (OUTPATIENT)
Age: 36
End: 2021-02-12

## 2021-02-12 ENCOUNTER — OUTPATIENT (OUTPATIENT)
Dept: OUTPATIENT SERVICES | Facility: HOSPITAL | Age: 36
LOS: 1 days | End: 2021-02-12

## 2021-02-12 DIAGNOSIS — Z98.890 OTHER SPECIFIED POSTPROCEDURAL STATES: Chronic | ICD-10-CM

## 2021-02-12 PROCEDURE — 70551 MRI BRAIN STEM W/O DYE: CPT | Mod: 26

## 2021-03-05 ENCOUNTER — APPOINTMENT (OUTPATIENT)
Dept: NEUROLOGY | Facility: CLINIC | Age: 36
End: 2021-03-05
Payer: COMMERCIAL

## 2021-03-05 VITALS
DIASTOLIC BLOOD PRESSURE: 77 MMHG | WEIGHT: 221 LBS | HEIGHT: 75 IN | HEART RATE: 88 BPM | BODY MASS INDEX: 27.48 KG/M2 | TEMPERATURE: 98.1 F | SYSTOLIC BLOOD PRESSURE: 133 MMHG | OXYGEN SATURATION: 97 %

## 2021-03-05 PROCEDURE — 99213 OFFICE O/P EST LOW 20 MIN: CPT

## 2021-03-05 PROCEDURE — 99072 ADDL SUPL MATRL&STAF TM PHE: CPT

## 2021-03-05 NOTE — HISTORY OF PRESENT ILLNESS
[FreeTextEntry1] : Initial hx 4/2019\par 4yo - had generalized seizure\par 13yo-14yo - multiple GTCs\par 14yo - had MRI showing WM lesions. also had SPECT scan showing intense focus in R medial temporal lobe. never had LP. \par throughout the years, has had several seizures. characterized by head deviation to the left. aura of vision loss, moving eyes in all directions, palpitations, sweating. \par last time he had a seizure was 2016 thought to be provoked by lack of sleep. \par childhood development was normal.\par On keppra since 2011.\par 3/2019 - abrupt blurred vision in both eyes X30min, along with intractable "migraine" and dizziness and some slurred speech, lasting for two weeks. went to see his sister, an optometrist, who advised pt to see neurologist Dr. Austin. Dr. Austin noted gait imbalance and sent to [facility], had MRI brain showing lesions, with a ruy+ lesion as well. Got 5d IVMP at [facility]. Sx improved after IVMP. Currently without headache or dizziness. \par Reports that he has occ chronic headaches, less than 1/month, occ takes OTCs with good effect.\par Never had prior episodes of unexplained neurological sx.\par \par \par Subj interval:\par \par No symptoms. Feels well. No HA.\par \par Thesis on development of biology as high school subject. Going to defend in 4/2021.\par \par 7mon baby girl.\par \par Got vaccine, barely headache, in february, moderna.\par \par PMHX:\par epilepsy\par RIS\par \par MEDS:\par keppra 500mg bid since clinical trial for keppra\par vitD3 1ku daily\par multiple vitamins\par \par O:\par \par AO3. Normally conversant. Follows commands, names, and repeats. Good attention.\par \par PERRL, VFF, EOMI, no nystagmus, face symmetric, TUP at midline.\par \par Motor: \par  R: L:\par Del 5 5\par Bi 5 5\par Tri 5 5\par Wrist Extensors 5 5\par Finger abductors 5 5\par  5 5 \par \par HF 5 5\par KE 5 5\par KF 5 5\par DF 5 5\par PF 5 5\par \par Tone R L\par UE 0 0 \par LE 0 0\par \par Sensory RUE LUE RLE LLE \par LT + + + +\par Vib + + + +\par JPS + + + +\par PP + + + +\par Temp + + + +\par \par Reflexes:\par  R L \par Biceps 2 2\par BR 2 2\par Triceps 2 2\par Pat 2 2 \par AJ 2 2\par \par TOES F F\par \par Coordination:\par  R L \par FTN 0 0 \par BOSTON 0 0\par HTS 0 0 \par \par Other \par  \par Gait: normal, can heel, toe, tandem\par \par  Assistance: none\par \par 4/2019\par ESR wnl\par PAULINA neg\par ssa/ssb neg\par ACE neg\par \par CSF 4/2019\par WBC 1\par glucose 64\par +OCBs\par prot 33\par igg index/synth wnl\par \par MRI brain 3/2019 - mod to severe wm lesion burden. pattern is certainly consistent with MS in the right setting, with radially oriented PV lesions and several clear MAISHA lesions. Read as small enhancing CC lesion - discussed with neuroradiology and confirmed.\par MRA head 3/2019 - unremarkable\par MRI C+T 3/2019 - no lesions\par \par MRI brain 8/2019 - read as stable brain MRI but ?one R occ horn lesion that was not clearly present on prior, but can only see this on saggital, not on axial, therefore likely an artifact.\par \par MRI brain 2/2020 - reviewed, unchanged from 8/2019.\par \par MRI brain 8/2020 - reviewed with neuroradiology, while read as having two small new foci, these were definitely present on prior scan, seen best on sagittal images in 2/2020. \par \par MRI brain 2/2021 - unchanged on my review and per report. \par \par AP: 36yo w/ epilepsy throughout his life, without sx that are clearly attributable to MS, with MRI at age 15 demonstrating WM lesions, recent episode of transient BL blurry vision X30m and HA and dizziness lasting weeks, MRI again demonstrating lesions w one ruy+ CC lesion.\par \par While brain MRI findings are consistent with MS in the right setting, the pt has not had episodes that are clearly attributable to MS. Fleeting BL blurry vision is of unclear etiology but is not indicative of optic neuropathy as this would not be fleeting; neuro-ophthalmology work up was negative. Dizziness and HA are nonspecific and pt's MRI did not demonstrate a posterior fossa lesion to correlate to these symptoms. Hence, would still classify this as RIS (MAISHA, PV, >9 lesions altogether, ruy+ lesion). \par \par MRIs have been stable.\par \par - cont to hold off on DMTs\par - repeat MRI brain wo contrast in 2/2022 (to be done at [facility])\par - f/u w Dr. Lewis for epilepsy\par - RTC 2/2022 after new MRI \par

## 2021-05-14 ENCOUNTER — TRANSCRIPTION ENCOUNTER (OUTPATIENT)
Age: 36
End: 2021-05-14

## 2021-05-25 ENCOUNTER — APPOINTMENT (OUTPATIENT)
Dept: NEUROLOGY | Facility: CLINIC | Age: 36
End: 2021-05-25
Payer: COMMERCIAL

## 2021-05-25 VITALS
SYSTOLIC BLOOD PRESSURE: 125 MMHG | DIASTOLIC BLOOD PRESSURE: 70 MMHG | OXYGEN SATURATION: 98 % | HEART RATE: 80 BPM | BODY MASS INDEX: 27.62 KG/M2 | TEMPERATURE: 97.7 F | HEIGHT: 75 IN | WEIGHT: 222.13 LBS

## 2021-05-25 PROCEDURE — 99213 OFFICE O/P EST LOW 20 MIN: CPT

## 2021-05-26 NOTE — PHYSICAL EXAM
[FreeTextEntry1] : A/A/Ox3\par good mood\par good attention\par follows 4 step commands\par normal language\par CN- normal\par No nystagmus \par no drift\par no dysmetria\par negative Romberg\par stable gait\par

## 2021-05-26 NOTE — HISTORY OF PRESENT ILLNESS
[FreeTextEntry1] : Shahbaz is here for the F/U.\par He got his vaccine. He is excited for his wife expecting a baby girl.Says he feels great is quite happy . working full time . He denies having had any events /seizures.\par sleeps well. No report of changes in his vision,color perception dexterity, balance, bladder function.\par No HA. His memory and mood is also very good.\par He did have MRI of the brain done in Feb.  and visited [facility] Davis after that. It did not show any change and or new lesion.\par He was told to do the MRI in 1-2 years.\par He says he feels very good

## 2021-05-26 NOTE — ASSESSMENT
[FreeTextEntry1] : Epilepsy ( generalized)\par Demyelinating disease\par \par Plan\par continue the keppra\par F/U with levels\par \par

## 2021-12-02 ENCOUNTER — APPOINTMENT (OUTPATIENT)
Dept: NEUROLOGY | Facility: CLINIC | Age: 36
End: 2021-12-02
Payer: COMMERCIAL

## 2021-12-02 VITALS
HEART RATE: 70 BPM | TEMPERATURE: 97.7 F | OXYGEN SATURATION: 99 % | DIASTOLIC BLOOD PRESSURE: 60 MMHG | SYSTOLIC BLOOD PRESSURE: 120 MMHG | WEIGHT: 221 LBS | BODY MASS INDEX: 27.48 KG/M2 | HEIGHT: 75 IN

## 2021-12-02 DIAGNOSIS — R93.0 ABNORMAL FINDINGS ON DIAGNOSTIC IMAGING OF SKULL AND HEAD, NOT ELSEWHERE CLASSIFIED: ICD-10-CM

## 2021-12-02 PROCEDURE — 99214 OFFICE O/P EST MOD 30 MIN: CPT

## 2021-12-02 NOTE — HISTORY OF PRESENT ILLNESS
[FreeTextEntry1] : Shahbaz is here for the F/U. He is doing well\par No events. no seizures\par He is now the father of a brand new child. he has two  and both are girls and he is happy and enjoying taking care of them\par He is working full time and separates his days of work between virtual and also physically going to the office.\par He denies having any visual symptoms\par He goes to her sister who is optometrist regularly\par Denies any weakness or loss of dexterity or sensory symptoms.\par His mood is good\par only issue is not having enough time to do some work or activity that could be challenging to his mind.\par He says his sleep is also not good enough overall this caused a sense of being slow or at times slightly disconnected?\par However when sleeps better feels better\par He is scheduled to see Dr Thomas for his regular F/U\par saw his PMD in almost a year ago\par

## 2021-12-02 NOTE — ASSESSMENT
[FreeTextEntry1] : PGE\par CNS  Demyelinating disease\par \par \par \par plan\par continue current level of care\par F/U with [facility]\par levels\par Discussed sleep and also exercise\par \par \par \par \par \par

## 2021-12-02 NOTE — PHYSICAL EXAM
[FreeTextEntry1] : A/A/Ox3\par good attention \par no psychomotor slowing\par Slightly tired\par normal language\par good color appreciation\par Pupils reactive\par No field cut\par no drift\par no dysmetria\par stable gait\par negative Romberg\par \par

## 2021-12-03 LAB
25(OH)D3 SERPL-MCNC: 21 NG/ML
ALBUMIN SERPL ELPH-MCNC: 4.7 G/DL
ALP BLD-CCNC: 107 U/L
ALT SERPL-CCNC: 57 U/L
ANION GAP SERPL CALC-SCNC: 12 MMOL/L
AST SERPL-CCNC: 27 U/L
BASOPHILS # BLD AUTO: 0.1 K/UL
BASOPHILS NFR BLD AUTO: 1.4 %
BILIRUB SERPL-MCNC: 0.5 MG/DL
BUN SERPL-MCNC: 17 MG/DL
CALCIUM SERPL-MCNC: 9.7 MG/DL
CHLORIDE SERPL-SCNC: 107 MMOL/L
CHOLEST SERPL-MCNC: 197 MG/DL
CO2 SERPL-SCNC: 22 MMOL/L
CREAT SERPL-MCNC: 1.1 MG/DL
EOSINOPHIL # BLD AUTO: 0.42 K/UL
EOSINOPHIL NFR BLD AUTO: 5.7 %
GLUCOSE SERPL-MCNC: 95 MG/DL
HCT VFR BLD CALC: 47.9 %
HDLC SERPL-MCNC: 48 MG/DL
HGB BLD-MCNC: 15.4 G/DL
IMM GRANULOCYTES NFR BLD AUTO: 0.4 %
LDLC SERPL CALC-MCNC: 129 MG/DL
LYMPHOCYTES # BLD AUTO: 2.87 K/UL
LYMPHOCYTES NFR BLD AUTO: 38.8 %
MAN DIFF?: NORMAL
MCHC RBC-ENTMCNC: 29.2 PG
MCHC RBC-ENTMCNC: 32.2 G/DL
MCV RBC AUTO: 90.7 FL
MONOCYTES # BLD AUTO: 0.59 K/UL
MONOCYTES NFR BLD AUTO: 8 %
NEUTROPHILS # BLD AUTO: 3.39 K/UL
NEUTROPHILS NFR BLD AUTO: 45.7 %
NONHDLC SERPL-MCNC: 149 MG/DL
PLATELET # BLD AUTO: 250 K/UL
POTASSIUM SERPL-SCNC: 4.4 MMOL/L
PROT SERPL-MCNC: 7.1 G/DL
RBC # BLD: 5.28 M/UL
RBC # FLD: 12.1 %
SODIUM SERPL-SCNC: 141 MMOL/L
TRIGL SERPL-MCNC: 79 MG/DL
WBC # FLD AUTO: 7.4 K/UL

## 2021-12-04 LAB — VIT B12 SERPL-MCNC: 703 PG/ML

## 2021-12-06 LAB — LEVETIRACETAM SERPL-MCNC: 4.6 UG/ML

## 2022-03-03 ENCOUNTER — APPOINTMENT (OUTPATIENT)
Dept: MRI IMAGING | Facility: CLINIC | Age: 37
End: 2022-03-03
Payer: COMMERCIAL

## 2022-03-03 ENCOUNTER — OUTPATIENT (OUTPATIENT)
Dept: OUTPATIENT SERVICES | Facility: HOSPITAL | Age: 37
LOS: 1 days | End: 2022-03-03

## 2022-03-03 DIAGNOSIS — Z98.890 OTHER SPECIFIED POSTPROCEDURAL STATES: Chronic | ICD-10-CM

## 2022-03-15 ENCOUNTER — RESULT REVIEW (OUTPATIENT)
Age: 37
End: 2022-03-15

## 2022-03-15 PROCEDURE — 70553 MRI BRAIN STEM W/O & W/DYE: CPT | Mod: 26

## 2022-04-04 ENCOUNTER — NON-APPOINTMENT (OUTPATIENT)
Age: 37
End: 2022-04-04

## 2022-04-13 ENCOUNTER — NON-APPOINTMENT (OUTPATIENT)
Age: 37
End: 2022-04-13

## 2022-04-13 ENCOUNTER — APPOINTMENT (OUTPATIENT)
Dept: NEUROLOGY | Facility: CLINIC | Age: 37
End: 2022-04-13
Payer: COMMERCIAL

## 2022-04-13 VITALS
HEIGHT: 75 IN | TEMPERATURE: 97.2 F | BODY MASS INDEX: 27.23 KG/M2 | OXYGEN SATURATION: 99 % | HEART RATE: 85 BPM | DIASTOLIC BLOOD PRESSURE: 84 MMHG | SYSTOLIC BLOOD PRESSURE: 129 MMHG | WEIGHT: 219 LBS

## 2022-04-13 PROCEDURE — 99214 OFFICE O/P EST MOD 30 MIN: CPT

## 2022-04-13 NOTE — HISTORY OF PRESENT ILLNESS
[FreeTextEntry1] : Initial hx 4/2019\par 6yo - had generalized seizure\par 11yo-16yo - multiple GTCs\par 16yo - had MRI showing WM lesions. also had SPECT scan showing intense focus in R medial temporal lobe. never had LP. \par throughout the years, has had several seizures. characterized by head deviation to the left. aura of vision loss, moving eyes in all directions, palpitations, sweating. \par last time he had a seizure was 2016 thought to be provoked by lack of sleep. \par childhood development was normal.\par On keppra since 2011.\par 3/2019 - abrupt blurred vision in both eyes X30min, along with intractable "migraine" and dizziness and some slurred speech, lasting for two weeks. went to see his sister, an optometrist, who advised pt to see neurologist Dr. Austin. Dr. Ausitn noted gait imbalance and sent to [facility], had MRI brain showing lesions, with a ruy+ lesion as well. Got 5d IVMP at [facility]. Sx improved after IVMP. Currently without headache or dizziness. \par Reports that he has occ chronic headaches, less than 1/month, occ takes OTCs with good effect.\par Never had prior episodes of unexplained neurological sx.\par Got vaccine, barely headache, in february, moderna.\par Boosted in 11/2021.\par 12/2021 - developed covid, very mild, some SOB x2m\par \par \par Subj interval:\par \par No symptoms. Feels well. No HA.\par \par Works in science education.\par \par Feels better overall now that he is back at work.\par \par 1yo and 6m old at home. Gets 6-7h of sleep.\par \par \par PMHX:\par epilepsy\par RIS\par \par MEDS:\par keppra 500mg bid since clinical trial for keppra\par vitD3 1ku daily\par multiple vitamins\par \par O:\par \par AO3. Normally conversant. Follows commands, names, and repeats. Good attention.\par \par PERRL, VFF, EOMI, no nystagmus, face symmetric, TUP at midline.\par \par Motor: \par  R: L:\par Del 5 5\par Bi 5 5\par Tri 5 5\par Wrist Extensors 5 5\par Finger abductors 5 5\par  5 5 \par \par HF 5 5\par KE 5 5\par KF 5 5\par DF 5 5\par PF 5 5\par \par Tone R L\par UE 0 0 \par LE 0 0\par \par Sensory RUE LUE RLE LLE \par LT + + + +\par Vib + + + +\par JPS + + + +\par PP + + + +\par Temp + + + +\par \par Reflexes:\par  R L \par Biceps 2 2\par BR 2 2\par Triceps 2 2\par Pat 2 2 \par AJ 2 2\par \par TOES F F\par \par Coordination:\par  R L \par FTN 0 0 \par BOSTON 0 0\par HTS 0 0 \par \par Other \par  \par Gait: normal, can heel, toe, tandem\par \par  Assistance: none\par \par \par 4/2019\par ESR wnl\par PAULINA neg\par ssa/ssb neg\par ACE neg\par \par CSF 4/2019\par WBC 1\par glucose 64\par +OCBs\par prot 33\par igg index/synth wnl\par \par \par MRI brain 3/2019 - mod to severe wm lesion burden. pattern is certainly consistent with MS in the right setting, with radially oriented PV lesions and several clear MAISHA lesions. Read as small enhancing CC lesion - discussed with neuroradiology and confirmed.\par MRA head 3/2019 - unremarkable\par MRI C+T 3/2019 - no lesions\par \par MRI brain 8/2019 - read as stable brain MRI but ?one R occ horn lesion that was not clearly present on prior, but can only see this on saggital, not on axial, therefore likely an artifact.\par \par MRI brain 2/2020 - reviewed, unchanged from 8/2019.\par \par MRI brain 8/2020 - reviewed with neuroradiology, while read as having two small new foci, these were definitely present on prior scan, seen best on sagittal images in 2/2020. \par \par MRI brain 2/2021 - unchanged on my review and per report. \par \par MRI brain 3/2022 - unchanged. \par \par \par AP: 36yo w/ epilepsy throughout his life, without sx that are clearly attributable to MS, with MRI at age 15 demonstrating WM lesions, recent episode of transient BL blurry vision X30m and HA and dizziness lasting weeks, MRI again demonstrating lesions w one ruy+ CC lesion.\par \par While brain MRI findings are consistent with MS in the right setting, the pt has not had episodes that are clearly attributable to MS. Fleeting BL blurry vision is of unclear etiology but is not indicative of optic neuropathy as this would not be fleeting; neuro-ophthalmology work up was negative. Dizziness and HA are nonspecific and pt's MRI did not demonstrate a posterior fossa lesion to correlate to these symptoms. Hence, would still classify this as RIS (MAISHA, PV, >9 lesions altogether, ruy+ lesion). \par \par MRIs have been stable and clinically stable.\par \par all questions answered, education provided, management discussed at length. imaging reviewed personally.\par \par - cont to hold off on DMTs\par - repeat MRI brain wo contrast in 3/2023 (to be done at [facility])\par - start vitD 4ku daily\par - f/u w Dr. Lewis for epilepsy\par - RTC 3/2023 after new MRI \par

## 2022-05-18 LAB
25(OH)D3 SERPL-MCNC: 55.6 NG/ML
ALBUMIN SERPL ELPH-MCNC: 5 G/DL
ALP BLD-CCNC: 96 U/L
ALT SERPL-CCNC: 45 U/L
ANION GAP SERPL CALC-SCNC: 15 MMOL/L
AST SERPL-CCNC: 23 U/L
BASOPHILS # BLD AUTO: 0.05 K/UL
BASOPHILS NFR BLD AUTO: 0.8 %
BILIRUB SERPL-MCNC: 0.6 MG/DL
BUN SERPL-MCNC: 14 MG/DL
CALCIUM SERPL-MCNC: 9.9 MG/DL
CHLORIDE SERPL-SCNC: 103 MMOL/L
CO2 SERPL-SCNC: 24 MMOL/L
CREAT SERPL-MCNC: 1.08 MG/DL
EGFR: 90 ML/MIN/1.73M2
EOSINOPHIL # BLD AUTO: 0.2 K/UL
EOSINOPHIL NFR BLD AUTO: 3.1 %
GLUCOSE SERPL-MCNC: 66 MG/DL
HCT VFR BLD CALC: 50.3 %
HGB BLD-MCNC: 15.6 G/DL
IMM GRANULOCYTES NFR BLD AUTO: 0.5 %
LYMPHOCYTES # BLD AUTO: 2.66 K/UL
LYMPHOCYTES NFR BLD AUTO: 41.1 %
MAN DIFF?: NORMAL
MCHC RBC-ENTMCNC: 29.2 PG
MCHC RBC-ENTMCNC: 31 GM/DL
MCV RBC AUTO: 94 FL
MONOCYTES # BLD AUTO: 0.58 K/UL
MONOCYTES NFR BLD AUTO: 9 %
NEUTROPHILS # BLD AUTO: 2.95 K/UL
NEUTROPHILS NFR BLD AUTO: 45.5 %
PLATELET # BLD AUTO: 219 K/UL
POTASSIUM SERPL-SCNC: 4.1 MMOL/L
PROT SERPL-MCNC: 7.3 G/DL
RBC # BLD: 5.35 M/UL
RBC # FLD: 12.6 %
SODIUM SERPL-SCNC: 142 MMOL/L
VIT B12 SERPL-MCNC: 789 PG/ML
WBC # FLD AUTO: 6.47 K/UL

## 2022-05-20 LAB — LEVETIRACETAM SERPL-MCNC: 5.1 UG/ML

## 2022-06-09 ENCOUNTER — APPOINTMENT (OUTPATIENT)
Dept: NEUROLOGY | Facility: CLINIC | Age: 37
End: 2022-06-09
Payer: COMMERCIAL

## 2022-06-09 VITALS
HEART RATE: 69 BPM | TEMPERATURE: 97.3 F | SYSTOLIC BLOOD PRESSURE: 119 MMHG | OXYGEN SATURATION: 99 % | HEIGHT: 75 IN | BODY MASS INDEX: 27.25 KG/M2 | DIASTOLIC BLOOD PRESSURE: 78 MMHG | WEIGHT: 219.19 LBS

## 2022-06-09 PROCEDURE — 99214 OFFICE O/P EST MOD 30 MIN: CPT

## 2022-06-10 NOTE — HISTORY OF PRESENT ILLNESS
[FreeTextEntry1] : Shahbaz is here for the F/U\par He is happy, doing well. No events.no seizure . He is back to the in person working and is happy and apparently works a lot\par He has two girls and is enjoying the family life.He sleeps good. feels rested \par He did have a blood test done that shows a Keppra level of 5.1 and slightly high LDL. He says it was perhaps due to the fact that in recent months he has been eating fast food more than usual.\par Not complaining of spinal or joint pain\par no vertigo\par no change in his vision . no change in color appreciation.\par No double vision. no loss of balance. no change in his gait . no change in the B/B function\par \par \par

## 2022-06-10 NOTE — ASSESSMENT
[FreeTextEntry1] : 1- Generalized epilepsy\par 2- demyelinating disease\par \par Stable in both aspects( exam and MRI)\par F/u 6 months

## 2022-06-10 NOTE — PHYSICAL EXAM
[FreeTextEntry1] : A/A/Ox3\par good mood\par good attention\par normal language\par Full ROM of the EOM\par no nystagmus\par no field cut\par pupils are reactive and equal\par no drift\par normal fine motor no fixation\par stable gait \par Negative Romberg\par stable gait

## 2022-09-27 ENCOUNTER — NON-APPOINTMENT (OUTPATIENT)
Age: 37
End: 2022-09-27

## 2022-09-29 ENCOUNTER — NON-APPOINTMENT (OUTPATIENT)
Age: 37
End: 2022-09-29

## 2022-10-03 ENCOUNTER — NON-APPOINTMENT (OUTPATIENT)
Age: 37
End: 2022-10-03

## 2022-10-05 ENCOUNTER — RESULT REVIEW (OUTPATIENT)
Age: 37
End: 2022-10-05

## 2022-10-05 ENCOUNTER — OUTPATIENT (OUTPATIENT)
Dept: OUTPATIENT SERVICES | Facility: HOSPITAL | Age: 37
LOS: 1 days | End: 2022-10-05

## 2022-10-05 ENCOUNTER — APPOINTMENT (OUTPATIENT)
Dept: MRI IMAGING | Facility: CLINIC | Age: 37
End: 2022-10-05

## 2022-10-05 DIAGNOSIS — Z98.890 OTHER SPECIFIED POSTPROCEDURAL STATES: Chronic | ICD-10-CM

## 2022-10-05 PROCEDURE — 70553 MRI BRAIN STEM W/O & W/DYE: CPT | Mod: 26

## 2022-10-06 LAB
25(OH)D3 SERPL-MCNC: 54.2 NG/ML
ALBUMIN SERPL ELPH-MCNC: 4.9 G/DL
ALP BLD-CCNC: 109 U/L
ALT SERPL-CCNC: 41 U/L
ANION GAP SERPL CALC-SCNC: 14 MMOL/L
AST SERPL-CCNC: 21 U/L
BASOPHILS # BLD AUTO: 0.05 K/UL
BASOPHILS NFR BLD AUTO: 0.8 %
BILIRUB SERPL-MCNC: 0.6 MG/DL
BUN SERPL-MCNC: 17 MG/DL
CALCIUM SERPL-MCNC: 9.7 MG/DL
CHLORIDE SERPL-SCNC: 102 MMOL/L
CO2 SERPL-SCNC: 24 MMOL/L
CREAT SERPL-MCNC: 1.1 MG/DL
EGFR: 88 ML/MIN/1.73M2
EOSINOPHIL # BLD AUTO: 0.16 K/UL
EOSINOPHIL NFR BLD AUTO: 2.5 %
GLUCOSE SERPL-MCNC: 79 MG/DL
HCT VFR BLD CALC: 50 %
HGB BLD-MCNC: 16.1 G/DL
IMM GRANULOCYTES NFR BLD AUTO: 0.6 %
LYMPHOCYTES # BLD AUTO: 2.17 K/UL
LYMPHOCYTES NFR BLD AUTO: 34.4 %
MAN DIFF?: NORMAL
MCHC RBC-ENTMCNC: 29.5 PG
MCHC RBC-ENTMCNC: 32.2 GM/DL
MCV RBC AUTO: 91.7 FL
MONOCYTES # BLD AUTO: 0.55 K/UL
MONOCYTES NFR BLD AUTO: 8.7 %
NEUTROPHILS # BLD AUTO: 3.34 K/UL
NEUTROPHILS NFR BLD AUTO: 53 %
PLATELET # BLD AUTO: 235 K/UL
POTASSIUM SERPL-SCNC: 4 MMOL/L
PROT SERPL-MCNC: 7.5 G/DL
RBC # BLD: 5.45 M/UL
RBC # FLD: 12.5 %
SODIUM SERPL-SCNC: 140 MMOL/L
WBC # FLD AUTO: 6.31 K/UL

## 2022-10-21 ENCOUNTER — RX RENEWAL (OUTPATIENT)
Age: 37
End: 2022-10-21

## 2022-10-24 ENCOUNTER — TRANSCRIPTION ENCOUNTER (OUTPATIENT)
Age: 37
End: 2022-10-24

## 2022-10-24 LAB — LEVETIRACETAM SERPL-MCNC: 4.6 UG/ML

## 2022-11-02 ENCOUNTER — APPOINTMENT (OUTPATIENT)
Dept: NEUROLOGY | Facility: CLINIC | Age: 37
End: 2022-11-02

## 2022-11-02 PROCEDURE — 95816 EEG AWAKE AND DROWSY: CPT

## 2022-12-05 ENCOUNTER — APPOINTMENT (OUTPATIENT)
Dept: NEUROLOGY | Facility: CLINIC | Age: 37
End: 2022-12-05

## 2022-12-05 VITALS
SYSTOLIC BLOOD PRESSURE: 124 MMHG | HEIGHT: 75 IN | BODY MASS INDEX: 27.1 KG/M2 | DIASTOLIC BLOOD PRESSURE: 70 MMHG | HEART RATE: 77 BPM | OXYGEN SATURATION: 99 % | WEIGHT: 218 LBS | TEMPERATURE: 97.3 F

## 2022-12-05 PROCEDURE — 99214 OFFICE O/P EST MOD 30 MIN: CPT

## 2022-12-06 NOTE — HISTORY OF PRESENT ILLNESS
[FreeTextEntry1] : Shahbaz is here for the F/U .In late September he did have an event. He describes it as  laying in the bed besides his wife. Suddenly he felt having dizziness/vertigo. Wife reported seeing him having perfused sweating.\par and he also  eye movements (? nystagmus) and also some shaking in the right UE.\par He denies having had any change in the MS . Wife also did not report any change. He then tried to stand up and felt weak and off Ballance\par He was also slightly nauseous.\par continued to feel slightly dizzy until the next day. During this there was no fever, hearing issues and cervical pain\par No weakness or particular sensory, bladder or visual symptoms\par After a day he went completely back to his baseline\par No other event since then\par He has had an MRI  w/WO in October that  showed no change\par EEG was also normal\par His blood test shows normal CBC/CMP and Vit D level.\par The Keppra level is at his usual around 5\par \par .\par

## 2022-12-06 NOTE — PHYSICAL EXAM
[FreeTextEntry1] : A/A/Ox3\par good mood\par good attention\par normal language \par follows 4 step commands\par CN- normal\par no nystagmus\par Normal castillo and Trent\par normal ROM of the neck\par no drift\par no dysmetria\par stable gait\par negative Romberg

## 2022-12-06 NOTE — ASSESSMENT
[FreeTextEntry1] : 1- Demyelinating disease stable\par 2- Seizure D/O\par plan\par f/u with levels\par F/U with [facility]

## 2022-12-23 NOTE — PATIENT PROFILE ADULT - NSPROSPHOSPCHAPLAINYN_GEN_A_NUR
Pt called she thinks that she might have a UTI. She said does not burn but hurts. She also noted that earlier she felt as though she was going to pass out but didn't. She doesn't know if that would happen with a UTI. I did go speak with KE and scheduled her to come in today   Future Appointments   Date Time Provider Jaye Tripathi   12/23/2022  2:30 PM DO Kelsi Arango 46 [facility] Marcia Noble asked that we please call pt to triage too. no

## 2023-03-10 ENCOUNTER — APPOINTMENT (OUTPATIENT)
Dept: NEUROLOGY | Facility: CLINIC | Age: 38
End: 2023-03-10
Payer: COMMERCIAL

## 2023-03-10 VITALS
SYSTOLIC BLOOD PRESSURE: 112 MMHG | HEART RATE: 81 BPM | TEMPERATURE: 95.4 F | DIASTOLIC BLOOD PRESSURE: 75 MMHG | OXYGEN SATURATION: 97 %

## 2023-03-10 PROCEDURE — 99215 OFFICE O/P EST HI 40 MIN: CPT

## 2023-03-10 NOTE — HISTORY OF PRESENT ILLNESS
[FreeTextEntry1] : Initial hx 4/2019\par 6yo - had generalized seizure\par 13yo-14yo - multiple GTCs\par 14yo - had MRI showing WM lesions. also had SPECT scan showing intense focus in R medial temporal lobe. never had LP. \par throughout the years, has had several seizures. characterized by head deviation to the left. aura of vision loss, moving eyes in all directions, palpitations, sweating. \par last time he had a seizure was 2016 thought to be provoked by lack of sleep. \par childhood development was normal.\par On keppra since 2011.\par 3/2019 - abrupt blurred vision in both eyes X30min, along with intractable "migraine" and dizziness and some slurred speech, lasting for two weeks. went to see his sister, an optometrist, who advised pt to see neurologist Dr. Austin. Dr. Austin noted gait imbalance and sent to [facility], had MRI brain showing lesions, with a ruy+ lesion as well. Got 5d IVMP at [facility]. Sx improved after IVMP. Currently without headache or dizziness. \par Reports that he has occ chronic headaches, less than 1/month, occ takes OTCs with good effect.\par Never had prior episodes of unexplained neurological sx.\par Got vaccine, barely headache, in february, moderna.\par Boosted in 11/2021.\par 12/2021 - developed covid, very mild, some SOB x2m\par 9/2022 - nausea, vomiting, tremor in the R hand, sweating profusely, generalized weakness. some room spinning mostly when getting up. severe symptoms lasted minutes. dizziness persisted for days - could not stand up. no trouble using arms, able to button buttons, able to use cell phone. was very fatigued. no fever, no diarrhe, no URI. 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 Feels physically and mentally exhausted. More irritable. Has a full day between increased work at work, and then family life.\par \par 4yo and 1.5y old at home. Gets 6-7h of sleep.\par \par Some urinary frequency and urgency.\par \par \par PMHX:\par epilepsy\par RIS\par \par MEDS:\par keppra 500mg bid since clinical trial for keppra\par vitD3 2ku daily\par multiple vitamins\par \par \par O:\par \par AO3. Normally conversant. Follows commands, names, and repeats. Good attention.\par \par PERRL, VFF, EOMI, no nystagmus, face symmetric, TUP at midline.\par \par Motor: \par  R: L:\par Del 5 5\par Bi 5 5\par Tri 5 5\par Wrist Extensors 5 5\par Finger abductors 5 5\par  5 5 \par \par HF 5 5\par KE 5 5\par KF 5 5\par DF 5 5\par PF 5 5\par \par Tone R L\par UE 0 0 \par LE 0 0\par \par Sensory RUE LUE RLE LLE \par LT + + + +\par Vib + + + +\par JPS + + + +\par PP + + + +\par Temp + + + +\par \par Reflexes:\par hypo throughout\par \par TOES F F\par \par Coordination:\par  R L \par FTN 0 0 \par BOSTON 0 0\par HTS 0 0 \par \par Other \par  \par Gait: normal, can heel, toe, tandem\par \par  Assistance: none\par \par \par CSF 4/2019\par WBC 1\par glucose 64\par +OCBs\par prot 33\par igg index/synth wnl\par \par \par MRI brain 3/2019 - mod to severe wm lesion burden. pattern is certainly consistent with MS in the right setting, with radially oriented PV lesions and several clear MAISHA lesions. Read as small enhancing CC lesion - discussed with neuroradiology and confirmed.\par MRA head 3/2019 - unremarkable\par MRI C+T 3/2019 - no lesions\par \par MRI brain 8/2019 - read as stable brain MRI but ?one R occ horn lesion that was not clearly present on prior, but can only see this on saggital, not on axial, therefore likely an artifact.\par \par MRI brain 2/2020 - reviewed, unchanged from 8/2019.\par \par MRI brain 8/2020 - reviewed with neuroradiology, while read as having two small new foci, these were definitely present on prior scan, seen best on sagittal images in 2/2020. \par \par MRI brain 2/2021 - unchanged on my review and per report. \par \par MRI brain 3/2022 - unchanged. \par \par MRI brain 10/2022 - unchanged. \par \par \par AP: 38yo w/ epilepsy throughout his life, without sx that are clearly attributable to MS, with MRI at age 15 demonstrating WM lesions, recent episode of transient BL blurry vision X30m and HA and dizziness lasting weeks, MRI again demonstrating lesions w one ruy+ CC lesion. While brain MRI findings are consistent with MS in the right setting, the pt has not had episodes that are clearly attributable to MS. Fleeting BL blurry vision is of unclear etiology but is not indicative of optic neuropathy as this would not be fleeting; neuro-ophthalmology work up was negative. Dizziness and HA are nonspecific and pt's MRI did not demonstrate a posterior fossa lesion to correlate to these symptoms. Hence, would still classify this as RIS (MAISHA, PV, >9 lesions altogether, ruy+ lesion). \par \par MRIs have been stable and RIS has been clinically stable.\par \par Some more fatigue - likely situational. Episode of dizziness in 9/2022 of unclear etiology but not likely related to RIS.\par \par all questions answered, education provided, management discussed at length. imaging reviewed personally.\par \par - cont to hold off on DMTs\par - repeat MRI brain wo contrast in 10/2024 (to be done at [facility])\par - cont vitD 2ku daily\par - f/u w Dr. Lewis for epilepsy\par - see Dr. Agosto for opinion on urinary symptoms, ?neurogenic \par - RTC 10/2023 after new MRI

## 2023-03-21 LAB
25(OH)D3 SERPL-MCNC: 27.2 NG/ML
ALBUMIN SERPL ELPH-MCNC: 4.7 G/DL
ALP BLD-CCNC: 110 U/L
ALT SERPL-CCNC: 49 U/L
ANION GAP SERPL CALC-SCNC: 13 MMOL/L
AST SERPL-CCNC: 25 U/L
BASOPHILS # BLD AUTO: 0.06 K/UL
BASOPHILS NFR BLD AUTO: 0.8 %
BILIRUB SERPL-MCNC: 0.7 MG/DL
BUN SERPL-MCNC: 15 MG/DL
CALCIUM SERPL-MCNC: 10.2 MG/DL
CHLORIDE SERPL-SCNC: 106 MMOL/L
CO2 SERPL-SCNC: 24 MMOL/L
CREAT SERPL-MCNC: 0.98 MG/DL
EGFR: 101 ML/MIN/1.73M2
EOSINOPHIL # BLD AUTO: 0.19 K/UL
EOSINOPHIL NFR BLD AUTO: 2.6 %
GLUCOSE SERPL-MCNC: 89 MG/DL
HCT VFR BLD CALC: 49.9 %
HGB BLD-MCNC: 16.2 G/DL
IMM GRANULOCYTES NFR BLD AUTO: 0.7 %
LYMPHOCYTES # BLD AUTO: 2.58 K/UL
LYMPHOCYTES NFR BLD AUTO: 36 %
MAN DIFF?: NORMAL
MCHC RBC-ENTMCNC: 29.2 PG
MCHC RBC-ENTMCNC: 32.5 GM/DL
MCV RBC AUTO: 90.1 FL
MONOCYTES # BLD AUTO: 0.63 K/UL
MONOCYTES NFR BLD AUTO: 8.8 %
NEUTROPHILS # BLD AUTO: 3.66 K/UL
NEUTROPHILS NFR BLD AUTO: 51.1 %
PLATELET # BLD AUTO: 255 K/UL
POTASSIUM SERPL-SCNC: 4.7 MMOL/L
PROT SERPL-MCNC: 7.4 G/DL
RBC # BLD: 5.54 M/UL
RBC # FLD: 12.3 %
SODIUM SERPL-SCNC: 143 MMOL/L
WBC # FLD AUTO: 7.17 K/UL

## 2023-03-22 LAB — LEVETIRACETAM SERPL-MCNC: 4.3 UG/ML

## 2023-03-23 ENCOUNTER — APPOINTMENT (OUTPATIENT)
Dept: UROLOGY | Facility: CLINIC | Age: 38
End: 2023-03-23
Payer: COMMERCIAL

## 2023-03-23 VITALS
TEMPERATURE: 97.7 F | SYSTOLIC BLOOD PRESSURE: 115 MMHG | HEART RATE: 76 BPM | DIASTOLIC BLOOD PRESSURE: 82 MMHG | OXYGEN SATURATION: 97 %

## 2023-03-23 PROCEDURE — 99204 OFFICE O/P NEW MOD 45 MIN: CPT

## 2023-03-24 NOTE — PHYSICAL EXAM
[General Appearance - Well Developed] : well developed [General Appearance - Well Nourished] : well nourished [Normal Appearance] : normal appearance [Well Groomed] : well groomed [General Appearance - In No Acute Distress] : no acute distress [Edema] : no peripheral edema [Respiration, Rhythm And Depth] : normal respiratory rhythm and effort [Exaggerated Use Of Accessory Muscles For Inspiration] : no accessory muscle use [Normal Station and Gait] : the gait and station were normal for the patient's age [] : no rash [No Focal Deficits] : no focal deficits [Oriented To Time, Place, And Person] : oriented to person, place, and time [Affect] : the affect was normal [Mood] : the mood was normal [Not Anxious] : not anxious [FreeTextEntry1] : deferred to the time of UDS

## 2023-03-24 NOTE — ASSESSMENT
[FreeTextEntry1] : \par \par Impression/plan: 38 year-old male with MS and epilepsy presents to the office with OAB.\par \par PVR 37 ml\par \par 1. UCx: r/o UTI. \par 2. Renal US; to r/o hydro\par 3. F/u UDS: to assess bladder function in the presence of MS and epilepsy. \par 4. Briefly discussed treatment option for OAB including BM, bladder retraining, medications, Botox for the bladder and neuromodulation. Risks and benefits discussed. Will obtain baseline UDS and proceed with plan of care after. \par \par I, Dr. Elayne Agosto, personally performed the evaluation and management (E/M) services for this new patient.  That E/M includes conducting the clinically appropriate initial history &/or exam, assessing all conditions, and establishing the plan of care.  Today, my STEPHANE, was here to observe &/or participate in the visit & follow plan of care established by me.\par \par \par
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
## 2023-03-27 ENCOUNTER — NON-APPOINTMENT (OUTPATIENT)
Age: 38
End: 2023-03-27

## 2023-03-27 LAB — BACTERIA UR CULT: NORMAL

## 2023-03-27 NOTE — H&P ADULT - CONSTITUTIONAL
cont statin , will f/u lipid profile cont statin , will f/u lipid profile cont statin , will f/u lipid profile cont statin , will f/u lipid profile cont statin , will f/u lipid profile cont statin , will f/u lipid profile cont statin , will f/u lipid profile cont statin , will f/u lipid profile cont statin , will f/u lipid profile cont statin , will f/u lipid profile cont statin , will f/u lipid profile cont statin , will f/u lipid profile cont statin , will f/u lipid profile detailed exam

## 2023-04-21 ENCOUNTER — APPOINTMENT (OUTPATIENT)
Dept: UROLOGY | Facility: CLINIC | Age: 38
End: 2023-04-21
Payer: COMMERCIAL

## 2023-04-21 VITALS
TEMPERATURE: 98.5 F | SYSTOLIC BLOOD PRESSURE: 115 MMHG | OXYGEN SATURATION: 97 % | DIASTOLIC BLOOD PRESSURE: 80 MMHG | HEART RATE: 94 BPM

## 2023-04-21 DIAGNOSIS — G35 MULTIPLE SCLEROSIS: ICD-10-CM

## 2023-04-21 DIAGNOSIS — R35.0 FREQUENCY OF MICTURITION: ICD-10-CM

## 2023-04-21 DIAGNOSIS — N32.81 OVERACTIVE BLADDER: ICD-10-CM

## 2023-04-21 PROCEDURE — 81003 URINALYSIS AUTO W/O SCOPE: CPT | Mod: QW

## 2023-04-21 PROCEDURE — 51728 CYSTOMETROGRAM W/VP: CPT

## 2023-04-21 PROCEDURE — 51797 INTRAABDOMINAL PRESSURE TEST: CPT

## 2023-04-21 PROCEDURE — 51741 ELECTRO-UROFLOWMETRY FIRST: CPT

## 2023-04-21 PROCEDURE — 51784 ANAL/URINARY MUSCLE STUDY: CPT

## 2023-04-21 PROCEDURE — 99215 OFFICE O/P EST HI 40 MIN: CPT | Mod: 25

## 2023-04-26 ENCOUNTER — RX RENEWAL (OUTPATIENT)
Age: 38
End: 2023-04-26
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
## 2023-04-26 NOTE — ASSESSMENT
[FreeTextEntry1] : \par \par Impression/plan: 38-year-old gentleman with multiple sclerosis with overactive bladder, urodynamic study reviewed with him, no DO noted.  He did have a relatively reduced flow with a borderline voiding pressure, bladder outlet obstruction index was 23 which is in the equivocal range.  We reviewed the etiology of his overactive bladder likely potentially being secondary to some degree of at least partial outlet obstruction.  We discussed the option of an alpha-blocker which he states he would like to hold off on any med therapy for now.Also discussed PFPT, will consider. F/u in 3-6 months. \par

## 2023-04-27 ENCOUNTER — NON-APPOINTMENT (OUTPATIENT)
Age: 38
End: 2023-04-27

## 2023-05-27 ENCOUNTER — LABORATORY RESULT (OUTPATIENT)
Age: 38
End: 2023-05-27

## 2023-05-27 LAB
ALBUMIN SERPL ELPH-MCNC: 4.6 G/DL
ALP BLD-CCNC: 102 U/L
ALT SERPL-CCNC: 46 U/L
ANION GAP SERPL CALC-SCNC: 16 MMOL/L
AST SERPL-CCNC: 26 U/L
BILIRUB SERPL-MCNC: 0.7 MG/DL
BUN SERPL-MCNC: 12 MG/DL
CALCIUM SERPL-MCNC: 9.5 MG/DL
CHLORIDE SERPL-SCNC: 104 MMOL/L
CHOLEST SERPL-MCNC: 159 MG/DL
CO2 SERPL-SCNC: 20 MMOL/L
CREAT SERPL-MCNC: 0.9 MG/DL
EGFR: 111 ML/MIN/1.73M2
GLUCOSE SERPL-MCNC: 95 MG/DL
HDLC SERPL-MCNC: 47 MG/DL
LDLC SERPL CALC-MCNC: 100 MG/DL
NONHDLC SERPL-MCNC: 112 MG/DL
POTASSIUM SERPL-SCNC: 4.2 MMOL/L
PROT SERPL-MCNC: 7.3 G/DL
SODIUM SERPL-SCNC: 140 MMOL/L
TRIGL SERPL-MCNC: 59 MG/DL

## 2023-06-15 ENCOUNTER — APPOINTMENT (OUTPATIENT)
Dept: NEUROLOGY | Facility: CLINIC | Age: 38
End: 2023-06-15
Payer: COMMERCIAL

## 2023-06-15 PROCEDURE — 99212 OFFICE O/P EST SF 10 MIN: CPT | Mod: 95

## 2023-06-17 NOTE — HISTORY OF PRESENT ILLNESS
[Other Location: e.g. School (Enter Location, City,State)___] : at [unfilled], at the time of the visit. [Medical Office: ([facility])___] : at the medical office located in  [Verbal consent obtained from patient] : the patient, [unfilled] [FreeTextEntry1] : Shahbaz wanted to do this as a telehealth visit\par HE is in his car coming back fro work\par He is doing very well with the seizures. No events suggestive of Sz. \par He is also doing well with his demyelinating disease \par No visual symptoms\par No sensory or motor symptoms\par normal balance and no difficulty with his memory and bladder function\par He says his nephew who is 6 Y/O was recently diagnosed with seizure ( ? focal)\par He is asking whether he needs to do genetics\par \par His weight is stable He sleeps well\par Betty\par continue current plan\par will consider genetics depending on the results from the nephew's study\par F/U

## 2023-06-19 LAB
BILIRUB UR QL STRIP: NORMAL
CLARITY UR: CLEAR
COLLECTION METHOD: NORMAL
GLUCOSE UR-MCNC: NORMAL
HCG UR QL: 0.2 EU/DL
HGB UR QL STRIP.AUTO: NORMAL
KETONES UR-MCNC: NORMAL
LEUKOCYTE ESTERASE UR QL STRIP: NORMAL
NITRITE UR QL STRIP: NORMAL
PH UR STRIP: 5.5
PROT UR STRIP-MCNC: NORMAL
SP GR UR STRIP: 1.01

## 2023-07-26 ENCOUNTER — RX CHANGE (OUTPATIENT)
Age: 38
End: 2023-07-26

## 2023-07-27 ENCOUNTER — RX CHANGE (OUTPATIENT)
Age: 38
End: 2023-07-27

## 2023-09-13 ENCOUNTER — APPOINTMENT (OUTPATIENT)
Dept: UROLOGY | Facility: CLINIC | Age: 38
End: 2023-09-13

## 2023-09-25 ENCOUNTER — APPOINTMENT (OUTPATIENT)
Dept: MRI IMAGING | Facility: CLINIC | Age: 38
End: 2023-09-25
Payer: COMMERCIAL

## 2023-09-25 ENCOUNTER — OUTPATIENT (OUTPATIENT)
Dept: OUTPATIENT SERVICES | Facility: HOSPITAL | Age: 38
LOS: 1 days | End: 2023-09-25

## 2023-09-25 DIAGNOSIS — Z98.890 OTHER SPECIFIED POSTPROCEDURAL STATES: Chronic | ICD-10-CM

## 2023-09-25 PROCEDURE — 72141 MRI NECK SPINE W/O DYE: CPT | Mod: 26

## 2023-09-25 PROCEDURE — 76377 3D RENDER W/INTRP POSTPROCES: CPT | Mod: 26

## 2023-09-25 PROCEDURE — 70551 MRI BRAIN STEM W/O DYE: CPT | Mod: 26

## 2023-09-25 PROCEDURE — 72146 MRI CHEST SPINE W/O DYE: CPT | Mod: 26

## 2023-09-26 ENCOUNTER — NON-APPOINTMENT (OUTPATIENT)
Age: 38
End: 2023-09-26

## 2023-09-26 ENCOUNTER — TRANSCRIPTION ENCOUNTER (OUTPATIENT)
Age: 38
End: 2023-09-26

## 2023-10-13 ENCOUNTER — TRANSCRIPTION ENCOUNTER (OUTPATIENT)
Age: 38
End: 2023-10-13

## 2023-10-13 ENCOUNTER — NON-APPOINTMENT (OUTPATIENT)
Age: 38
End: 2023-10-13

## 2023-12-15 ENCOUNTER — RX RENEWAL (OUTPATIENT)
Age: 38
End: 2023-12-15

## 2024-01-25 ENCOUNTER — APPOINTMENT (OUTPATIENT)
Dept: NEUROLOGY | Facility: CLINIC | Age: 39
End: 2024-01-25
Payer: COMMERCIAL

## 2024-01-25 VITALS
TEMPERATURE: 97.8 F | BODY MASS INDEX: 28.47 KG/M2 | OXYGEN SATURATION: 100 % | SYSTOLIC BLOOD PRESSURE: 129 MMHG | DIASTOLIC BLOOD PRESSURE: 82 MMHG | HEIGHT: 75 IN | WEIGHT: 229 LBS | HEART RATE: 97 BPM

## 2024-01-25 DIAGNOSIS — G37.9 DEMYELINATING DISEASE OF CENTRAL NERVOUS SYSTEM, UNSPECIFIED: ICD-10-CM

## 2024-01-25 DIAGNOSIS — G40.909 EPILEPSY, UNSPECIFIED, NOT INTRACTABLE, W/OUT STATUS EPILEPTICUS: ICD-10-CM

## 2024-01-25 PROCEDURE — 99213 OFFICE O/P EST LOW 20 MIN: CPT

## 2024-01-27 LAB
ALBUMIN SERPL ELPH-MCNC: 4.6 G/DL
ALP BLD-CCNC: 106 U/L
ALT SERPL-CCNC: 59 U/L
ANION GAP SERPL CALC-SCNC: 10 MMOL/L
AST SERPL-CCNC: 30 U/L
BILIRUB SERPL-MCNC: 0.6 MG/DL
BUN SERPL-MCNC: 15 MG/DL
CALCIUM SERPL-MCNC: 9.8 MG/DL
CHLORIDE SERPL-SCNC: 105 MMOL/L
CHOLEST SERPL-MCNC: 190 MG/DL
CO2 SERPL-SCNC: 25 MMOL/L
CREAT SERPL-MCNC: 0.9 MG/DL
EGFR: 111 ML/MIN/1.73M2
GLUCOSE SERPL-MCNC: 95 MG/DL
HCT VFR BLD CALC: 49.4 %
HDLC SERPL-MCNC: 50 MG/DL
HGB BLD-MCNC: 16.3 G/DL
LDLC SERPL CALC-MCNC: 123 MG/DL
MCHC RBC-ENTMCNC: 29.6 PG
MCHC RBC-ENTMCNC: 33 G/DL
MCV RBC AUTO: 89.8 FL
NONHDLC SERPL-MCNC: 140 MG/DL
PLATELET # BLD AUTO: 231 K/UL
PMV BLD: 9.7 FL
POTASSIUM SERPL-SCNC: 4.5 MMOL/L
PROT SERPL-MCNC: 7.4 G/DL
RBC # BLD: 5.5 M/UL
RBC # FLD: 12.4 %
SODIUM SERPL-SCNC: 140 MMOL/L
TRIGL SERPL-MCNC: 86 MG/DL
TSH SERPL-ACNC: 2.11 UIU/ML
WBC # FLD AUTO: 6.75 K/UL

## 2024-01-28 LAB — 25(OH)D3 SERPL-MCNC: 39 NG/ML

## 2024-01-30 LAB — LEVETIRACETAM SERPL-MCNC: 4.6 UG/ML

## 2024-01-31 NOTE — HISTORY OF PRESENT ILLNESS
[FreeTextEntry1] : Shahbaz is here for the F/U He is at his baseline Happy and content ,but working very hard. He says the job demands had increased and it is at times overwhelming and also less rewarding He did have an MRI of the brain done that showed no change  and/or enhancing lesion The MRI of the C-spine was done at the same time and showed no cord signal. it only showed Degenerative and C5-6 disease The T-Spine also did not show any change He denies having any new symptoms. His balance is good stopping the MV , his bladder / urinary symptoms had improved  He denies having issues with the mood and memory.

## 2024-01-31 NOTE — PHYSICAL EXAM
[FreeTextEntry1] : A/A/Ox3 good mood good attention normal language No double vision normal ROM of the EOM no drift no dysmetria stable gait Negative Romberg

## 2024-03-04 ENCOUNTER — RX RENEWAL (OUTPATIENT)
Age: 39
End: 2024-03-04

## 2024-03-22 ENCOUNTER — APPOINTMENT (OUTPATIENT)
Dept: NEUROLOGY | Facility: CLINIC | Age: 39
End: 2024-03-22
Payer: COMMERCIAL

## 2024-03-22 VITALS
WEIGHT: 225 LBS | TEMPERATURE: 97.34 F | HEART RATE: 75 BPM | HEIGHT: 75 IN | DIASTOLIC BLOOD PRESSURE: 73 MMHG | BODY MASS INDEX: 27.98 KG/M2 | SYSTOLIC BLOOD PRESSURE: 108 MMHG | OXYGEN SATURATION: 97 %

## 2024-03-22 PROCEDURE — 99214 OFFICE O/P EST MOD 30 MIN: CPT

## 2024-03-22 PROCEDURE — G2211 COMPLEX E/M VISIT ADD ON: CPT

## 2024-03-22 NOTE — HISTORY OF PRESENT ILLNESS
[FreeTextEntry1] : Initial hx 4/2019 6yo - had generalized seizure 11yo-16yo - multiple GTCs 16yo - had MRI showing WM lesions. also had SPECT scan showing intense focus in R medial temporal lobe. never had LP.  throughout the years, has had several seizures. characterized by head deviation to the left. aura of vision loss, moving eyes in all directions, palpitations, sweating.  last time he had a seizure was 2016 thought to be provoked by lack of sleep.  childhood development was normal. On keppra since 2011. 3/2019 - abrupt blurred vision in both eyes X30min, along with intractable "migraine" and dizziness and some slurred speech, lasting for two weeks. went to see his sister, an optometrist, who advised pt to see neurologist Dr. Austin. Dr. Austin noted gait imbalance and sent to [facility], had MRI brain showing lesions, with a ruy+ lesion as well. Got 5d IVMP at [facility]. Sx improved after IVMP. Currently without headache or dizziness.  Reports that he has occ chronic headaches, less than 1/month, occ takes OTCs with good effect. Never had prior episodes of unexplained neurological sx. Got vaccine, barely headache, in february, moderna. Boosted in 11/2021. 12/2021 - developed covid, very mild, some SOB x2m 9/2022 - nausea, vomiting, tremor in the R hand, sweating profusely, generalized weakness. some room spinning mostly when getting up. severe symptoms lasted minutes. dizziness persisted for days - could not stand up. no trouble using arms, able to button buttons, able to use cell phone. was very fatigued. no fever, no diarrhe, no URI. MRI brain was unchanged.   Subj interval:  No MS symptoms.  Urinary urgency resolved as of early 2024 when he stopped supplement. Saw Dr. Agosto and UDS suggested partial outlet obstruction. However, urgency has resolved.  Works in science education. Has been having more anxiety at work. Feels physically and mentally exhausted. More irritable. Has a full day between increased work at work, and then family life.  3yo and 2y old at home. Gets 6-7h of sleep.    PMHX: epilepsy RIS  MEDS: keppra 500mg bid since clinical trial for keppra vitD3 2ku daily   O:  AO3. Normally conversant. Follows commands, names, and repeats. Good attention.  PERRL, VFF, EOMI, no nystagmus, face symmetric, TUP at midline.  Motor:   R: L: Del 5 5 Bi 5 5 Tri 5 5 Wrist Extensors 5 5 Finger abductors 5 5  5 5   HF 5 5 KE 5 5 KF 5 5 DF 5 5 PF 5 5  Tone R L UE 0 0  LE 0 0  Sensory RUE LUE RLE LLE  LT + + + + Vib + + + + JPS + + + + PP + + + + Temp + + + +  Reflexes: hypo throughout  TOES F F  Coordination:  R L  FTN 0 0  BOSTON 0 0 HTS 0 0   Other    Gait: normal, can heel, toe, tandem   Assistance: none   CSF 4/2019 WBC 1 glucose 64 +OCBs prot 33 igg index/synth wnl   MRI brain 3/2019 - mod to severe wm lesion burden. pattern is certainly consistent with MS in the right setting, with radially oriented PV lesions and several clear MAISHA lesions. Read as small enhancing CC lesion - discussed with neuroradiology and confirmed. MRA head 3/2019 - unremarkable MRI C+T 3/2019 - no lesions  MRI brain 8/2019 - read as stable brain MRI but ?one R occ horn lesion that was not clearly present on prior, but can only see this on saggital, not on axial, therefore likely an artifact.  MRI brain 2/2020 - reviewed, unchanged from 8/2019.  MRI brain 8/2020 - reviewed with neuroradiology, while read as having two small new foci, these were definitely present on prior scan, seen best on sagittal images in 2/2020.   MRI brain 2/2021 - unchanged on my review and per report.   MRI brain 3/2022 - unchanged.   MRI brain 10/2022 - unchanged.   MRI brain 9/2023 - stable.  MRI C and T spine 9/2023 - stable   AP: 37yo w/ epilepsy throughout his life, without sx that are clearly attributable to MS, with MRI at age 15 demonstrating WM lesions, recent episode of transient BL blurry vision X30m and HA and dizziness lasting weeks, MRI again demonstrating lesions w one ruy+ CC lesion. While brain MRI findings are consistent with MS in the right setting, the pt has not had episodes that are clearly attributable to MS. Fleeting BL blurry vision is of unclear etiology but is not indicative of optic neuropathy as this would not be fleeting; neuro-ophthalmology work up was negative. Dizziness and HA are nonspecific and pt's MRI did not demonstrate a posterior fossa lesion to correlate to these symptoms. Hence, would still classify this as RIS (MAISHA, PV, >9 lesions altogether, ruy+ lesion).   MRIs have been stable and RIS has been clinically stable.  Urinary urgency has resolved. Likely not MS related and likely related to supplement which he stopped.  Some fatigue - likely situational. Episode of dizziness in 9/2022 of unclear etiology but not likely related to RIS.  all questions answered, education provided, management discussed at length. imaging reviewed personally.  - cont to hold off on DMTs - repeat MRI brain wo contrast in 9/2024 (to be done at [facility]) - cont vitD 2ku daily - f/u w Dr. Lewis for epilepsy - RTC 1y

## 2024-05-02 ENCOUNTER — RX RENEWAL (OUTPATIENT)
Age: 39
End: 2024-05-02

## 2024-05-28 ENCOUNTER — RX RENEWAL (OUTPATIENT)
Age: 39
End: 2024-05-28

## 2024-05-28 RX ORDER — ADHESIVE TAPE 3"X 2.3 YD
50 MCG TAPE, NON-MEDICATED TOPICAL DAILY
Qty: 60 | Refills: 11 | Status: ACTIVE | COMMUNITY
Start: 2022-04-13 | End: 1900-01-01

## 2024-07-02 ENCOUNTER — RX RENEWAL (OUTPATIENT)
Age: 39
End: 2024-07-02

## 2024-07-10 ENCOUNTER — RX CHANGE (OUTPATIENT)
Age: 39
End: 2024-07-10

## 2024-07-10 RX ORDER — LEVETIRACETAM 500 MG/1
500 TABLET, FILM COATED ORAL
Qty: 180 | Refills: 1 | Status: ACTIVE | COMMUNITY
Start: 1900-01-01 | End: 1900-01-01

## 2024-07-25 ENCOUNTER — APPOINTMENT (OUTPATIENT)
Dept: NEUROLOGY | Facility: CLINIC | Age: 39
End: 2024-07-25

## 2024-08-26 ENCOUNTER — APPOINTMENT (OUTPATIENT)
Dept: NEUROLOGY | Facility: CLINIC | Age: 39
End: 2024-08-26
Payer: COMMERCIAL

## 2024-08-26 VITALS
HEART RATE: 92 BPM | DIASTOLIC BLOOD PRESSURE: 84 MMHG | HEIGHT: 75 IN | OXYGEN SATURATION: 95 % | SYSTOLIC BLOOD PRESSURE: 130 MMHG | RESPIRATION RATE: 12 BRPM | BODY MASS INDEX: 29.59 KG/M2 | WEIGHT: 238 LBS

## 2024-08-26 DIAGNOSIS — R93.0 ABNORMAL FINDINGS ON DIAGNOSTIC IMAGING OF SKULL AND HEAD, NOT ELSEWHERE CLASSIFIED: ICD-10-CM

## 2024-08-26 DIAGNOSIS — G40.909 EPILEPSY, UNSPECIFIED, NOT INTRACTABLE, W/OUT STATUS EPILEPTICUS: ICD-10-CM

## 2024-08-26 PROCEDURE — 99214 OFFICE O/P EST MOD 30 MIN: CPT

## 2024-08-27 NOTE — ASSESSMENT
[FreeTextEntry1] : 1- demyelinating disease 2- epilepsy    Plan F/U levels discussed  the low level of anxiety and some suggestions

## 2024-08-27 NOTE — PHYSICAL EXAM
[FreeTextEntry1] : A/A/Ox3 good mood good attention normal language No double vision normal ROM of the EOM no drift no dysmetria stable gait Negative Romberg.

## 2024-08-27 NOTE — HISTORY OF PRESENT ILLNESS
[FreeTextEntry1] : Shahbaz is here for the F/U. He is doing well No events suggestive of Sz. He denies having any visual changes. No double vision no change in the dexterity and balance  His mood and memory are good His bladder function according to him is back to the normal He did have a period of abdominal pain and possible inguinal region lymphadenopathy . Having a new PMD , they did do a complete W/U including CT of the abdomen, US and blood tests. All came out well and he is having no symptoms now. He slepps about 6 hours and feels completely fresh Works hard talks about becoming tense / apprehensive? if she does not have any thing to do . < it is hard for him to relax. I was always like this Last blod level was in Jan 24

## 2024-08-31 LAB
ALBUMIN SERPL ELPH-MCNC: 4.6 G/DL
ALP BLD-CCNC: 98 U/L
ALT SERPL-CCNC: 47 U/L
ANION GAP SERPL CALC-SCNC: 10 MMOL/L
AST SERPL-CCNC: 23 U/L
BILIRUB SERPL-MCNC: 0.5 MG/DL
BUN SERPL-MCNC: 11 MG/DL
CALCIUM SERPL-MCNC: 9.8 MG/DL
CHLORIDE SERPL-SCNC: 106 MMOL/L
CHOLEST SERPL-MCNC: 195 MG/DL
CO2 SERPL-SCNC: 24 MMOL/L
CREAT SERPL-MCNC: 1 MG/DL
EGFR: 97 ML/MIN/1.73M2
GLUCOSE SERPL-MCNC: 102 MG/DL
HCT VFR BLD CALC: 49.3 %
HDLC SERPL-MCNC: 44 MG/DL
HGB BLD-MCNC: 16.2 G/DL
LDLC SERPL CALC-MCNC: 129 MG/DL
MCHC RBC-ENTMCNC: 29.5 PG
MCHC RBC-ENTMCNC: 32.9 G/DL
MCV RBC AUTO: 89.8 FL
NONHDLC SERPL-MCNC: 151 MG/DL
PLATELET # BLD AUTO: 225 K/UL
PMV BLD AUTO: 0 /100 WBCS
PMV BLD: 9.9 FL
POTASSIUM SERPL-SCNC: 4.6 MMOL/L
PROT SERPL-MCNC: 7.2 G/DL
RBC # BLD: 5.49 M/UL
RBC # FLD: 12.3 %
SODIUM SERPL-SCNC: 140 MMOL/L
TRIGL SERPL-MCNC: 108 MG/DL
WBC # FLD AUTO: 6.78 K/UL

## 2024-09-04 LAB — LEVETIRACETAM SERPL-MCNC: 3.7 UG/ML

## 2024-10-04 ENCOUNTER — RX RENEWAL (OUTPATIENT)
Age: 39
End: 2024-10-04

## 2024-11-22 ENCOUNTER — RX RENEWAL (OUTPATIENT)
Age: 39
End: 2024-11-22

## 2025-01-15 ENCOUNTER — RX RENEWAL (OUTPATIENT)
Age: 40
End: 2025-01-15

## 2025-02-20 ENCOUNTER — APPOINTMENT (OUTPATIENT)
Dept: NEUROLOGY | Facility: CLINIC | Age: 40
End: 2025-02-20
Payer: COMMERCIAL

## 2025-02-20 ENCOUNTER — NON-APPOINTMENT (OUTPATIENT)
Age: 40
End: 2025-02-20

## 2025-02-20 VITALS
DIASTOLIC BLOOD PRESSURE: 84 MMHG | HEART RATE: 82 BPM | SYSTOLIC BLOOD PRESSURE: 127 MMHG | BODY MASS INDEX: 28.6 KG/M2 | HEIGHT: 75 IN | RESPIRATION RATE: 18 BRPM | WEIGHT: 230 LBS | OXYGEN SATURATION: 98 %

## 2025-02-20 DIAGNOSIS — G40.909 EPILEPSY, UNSPECIFIED, NOT INTRACTABLE, W/OUT STATUS EPILEPTICUS: ICD-10-CM

## 2025-02-20 PROCEDURE — 99213 OFFICE O/P EST LOW 20 MIN: CPT

## 2025-02-20 PROCEDURE — 95816 EEG AWAKE AND DROWSY: CPT

## 2025-03-10 ENCOUNTER — RX RENEWAL (OUTPATIENT)
Age: 40
End: 2025-03-10

## 2025-03-31 ENCOUNTER — NON-APPOINTMENT (OUTPATIENT)
Age: 40
End: 2025-03-31

## 2025-04-02 ENCOUNTER — APPOINTMENT (OUTPATIENT)
Dept: NEUROLOGY | Facility: CLINIC | Age: 40
End: 2025-04-02
Payer: COMMERCIAL

## 2025-04-02 ENCOUNTER — NON-APPOINTMENT (OUTPATIENT)
Age: 40
End: 2025-04-02

## 2025-04-02 VITALS
TEMPERATURE: 98.2 F | BODY MASS INDEX: 28.97 KG/M2 | HEIGHT: 75 IN | HEART RATE: 93 BPM | WEIGHT: 233 LBS | DIASTOLIC BLOOD PRESSURE: 86 MMHG | OXYGEN SATURATION: 98 % | SYSTOLIC BLOOD PRESSURE: 127 MMHG

## 2025-04-02 PROCEDURE — G2211 COMPLEX E/M VISIT ADD ON: CPT | Mod: NC

## 2025-04-02 PROCEDURE — 99214 OFFICE O/P EST MOD 30 MIN: CPT

## 2025-04-02 RX ORDER — ALBUTEROL SULFATE 90 UG/1
108 (90 BASE) INHALANT RESPIRATORY (INHALATION)
Refills: 0 | Status: ACTIVE | COMMUNITY

## 2025-05-07 ENCOUNTER — RX RENEWAL (OUTPATIENT)
Age: 40
End: 2025-05-07

## 2025-06-27 ENCOUNTER — RX RENEWAL (OUTPATIENT)
Age: 40
End: 2025-06-27

## 2025-08-01 ENCOUNTER — OUTPATIENT (OUTPATIENT)
Dept: OUTPATIENT SERVICES | Facility: HOSPITAL | Age: 40
LOS: 1 days | End: 2025-08-01

## 2025-08-01 ENCOUNTER — APPOINTMENT (OUTPATIENT)
Dept: MRI IMAGING | Facility: CLINIC | Age: 40
End: 2025-08-01
Payer: COMMERCIAL

## 2025-08-01 DIAGNOSIS — Z98.890 OTHER SPECIFIED POSTPROCEDURAL STATES: Chronic | ICD-10-CM

## 2025-08-01 PROCEDURE — 76377 3D RENDER W/INTRP POSTPROCES: CPT | Mod: 26

## 2025-08-01 PROCEDURE — 70551 MRI BRAIN STEM W/O DYE: CPT | Mod: 26

## 2025-08-06 ENCOUNTER — TRANSCRIPTION ENCOUNTER (OUTPATIENT)
Age: 40
End: 2025-08-06

## 2025-08-06 ENCOUNTER — NON-APPOINTMENT (OUTPATIENT)
Age: 40
End: 2025-08-06

## 2025-08-19 ENCOUNTER — RX RENEWAL (OUTPATIENT)
Age: 40
End: 2025-08-19